# Patient Record
Sex: FEMALE | Race: OTHER | Employment: OTHER | URBAN - METROPOLITAN AREA
[De-identification: names, ages, dates, MRNs, and addresses within clinical notes are randomized per-mention and may not be internally consistent; named-entity substitution may affect disease eponyms.]

---

## 2017-01-31 RX ORDER — ALBUTEROL SULFATE 90 UG/1
AEROSOL, METERED RESPIRATORY (INHALATION)
Qty: 8.5 INHALER | Refills: 5 | Status: SHIPPED | OUTPATIENT
Start: 2017-01-31 | End: 2018-06-21 | Stop reason: SDUPTHER

## 2017-03-01 DIAGNOSIS — I10 ESSENTIAL HYPERTENSION WITH GOAL BLOOD PRESSURE LESS THAN 130/85: ICD-10-CM

## 2017-03-02 RX ORDER — AMLODIPINE BESYLATE 5 MG/1
TABLET ORAL
Qty: 30 TAB | Refills: 3 | Status: SHIPPED | OUTPATIENT
Start: 2017-03-02 | End: 2017-03-08 | Stop reason: SDUPTHER

## 2017-03-08 ENCOUNTER — OFFICE VISIT (OUTPATIENT)
Dept: FAMILY MEDICINE CLINIC | Age: 82
End: 2017-03-08

## 2017-03-08 VITALS
SYSTOLIC BLOOD PRESSURE: 137 MMHG | WEIGHT: 93.4 LBS | DIASTOLIC BLOOD PRESSURE: 83 MMHG | BODY MASS INDEX: 19.61 KG/M2 | RESPIRATION RATE: 18 BRPM | TEMPERATURE: 97.3 F | HEART RATE: 70 BPM | OXYGEN SATURATION: 98 % | HEIGHT: 58 IN

## 2017-03-08 DIAGNOSIS — E03.9 ACQUIRED HYPOTHYROIDISM: ICD-10-CM

## 2017-03-08 DIAGNOSIS — I10 ESSENTIAL HYPERTENSION: Primary | ICD-10-CM

## 2017-03-08 DIAGNOSIS — J45.909 UNCOMPLICATED ASTHMA, UNSPECIFIED ASTHMA SEVERITY: ICD-10-CM

## 2017-03-08 DIAGNOSIS — E87.6 HYPOKALEMIA: ICD-10-CM

## 2017-03-08 RX ORDER — VALSARTAN AND HYDROCHLOROTHIAZIDE 160; 12.5 MG/1; MG/1
TABLET, FILM COATED ORAL
Qty: 30 TAB | Refills: 3 | Status: SHIPPED | OUTPATIENT
Start: 2017-03-08 | End: 2017-07-12 | Stop reason: SDUPTHER

## 2017-03-08 RX ORDER — BUDESONIDE AND FORMOTEROL FUMARATE DIHYDRATE 160; 4.5 UG/1; UG/1
2 AEROSOL RESPIRATORY (INHALATION) 2 TIMES DAILY
Qty: 1 INHALER | Refills: 5 | Status: SHIPPED | OUTPATIENT
Start: 2017-03-08 | End: 2017-07-12 | Stop reason: SDUPTHER

## 2017-03-08 RX ORDER — LEVOTHYROXINE SODIUM 75 UG/1
75 TABLET ORAL
Qty: 30 TAB | Refills: 3 | Status: SHIPPED | OUTPATIENT
Start: 2017-03-08 | End: 2017-07-12 | Stop reason: SDUPTHER

## 2017-03-08 RX ORDER — AMLODIPINE BESYLATE 5 MG/1
TABLET ORAL
Qty: 30 TAB | Refills: 3 | Status: SHIPPED | OUTPATIENT
Start: 2017-03-08 | End: 2017-07-12 | Stop reason: SDUPTHER

## 2017-03-08 RX ORDER — POTASSIUM CHLORIDE 20 MEQ/1
20 TABLET, EXTENDED RELEASE ORAL 2 TIMES DAILY
Qty: 60 TAB | Refills: 3 | Status: SHIPPED | OUTPATIENT
Start: 2017-03-08 | End: 2017-11-15 | Stop reason: SDUPTHER

## 2017-03-08 NOTE — PROGRESS NOTES
HISTORY OF PRESENT ILLNESS  Mk Quintanilla Res is a 80 y.o. female. HPI  HTN, stable, bp is well controlled on meds daily    Hypothyroid, stable on synthroid daily    Asthma, stable, no wheezing or sob, no cough    Hypokalemia, stable on Kcl, last K level was 3.6  Review of Systems   Respiratory: Negative for cough, shortness of breath and wheezing. Cardiovascular: Negative for chest pain, palpitations and leg swelling. Gastrointestinal: Negative for abdominal pain, melena, nausea and vomiting. Musculoskeletal: Negative for falls and myalgias. Neurological: Negative for dizziness and headaches. Physical Exam   Constitutional: She appears well-developed and well-nourished. Neck: Neck supple. No thyromegaly present. Cardiovascular: Normal rate and normal heart sounds. An irregularly irregular rhythm present. No murmur heard. Pulmonary/Chest: Effort normal and breath sounds normal. She has no wheezes. She has no rales. Abdominal: Soft. Bowel sounds are normal. There is no tenderness. A hernia (LLQ, only when standing, reducible, not tender) is present. Musculoskeletal: She exhibits no edema. Neurological: She is alert. Vitals reviewed. ASSESSMENT and PLAN  Jeanie Mcgrath was seen today for hypertension. Diagnoses and all orders for this visit:    Essential hypertension, stable  -     amLODIPine (NORVASC) 5 mg tablet; take 1 tablet by mouth once daily  -     valsartan-hydroCHLOROthiazide (DIOVAN-HCT) 160-12.5 mg per tablet; take 1 tablet by mouth once daily  -     METABOLIC PANEL, BASIC; Future    Acquired hypothyroidism, stable  -     levothyroxine (SYNTHROID) 75 mcg tablet; Take 1 Tab by mouth Daily (before breakfast). -     TSH 3RD GENERATION; Future  -     T4, FREE; Future    Uncomplicated asthma, unspecified asthma severity, stable  -     budesonide-formoterol (SYMBICORT) 160-4.5 mcg/actuation HFA inhaler; Take 2 Puffs by inhalation two (2) times a day.     Hypokalemia, stable  - potassium chloride (KLOR-CON M20) 20 mEq tablet; Take 1 Tab by mouth two (2) times a day. -     METABOLIC PANEL, BASIC; Future    Lab Results   Component Value Date/Time    Sodium 146 11/21/2016 03:05 PM    Potassium 3.6 11/21/2016 03:05 PM    Chloride 107 11/21/2016 03:05 PM    CO2 31 11/21/2016 03:05 PM    Anion gap 8 11/21/2016 03:05 PM    Glucose 82 11/21/2016 03:05 PM    BUN 31 11/21/2016 03:05 PM    Creatinine 1.01 11/21/2016 03:05 PM    BUN/Creatinine ratio 31 11/21/2016 03:05 PM    GFR est AA >60 11/21/2016 03:05 PM    GFR est non-AA 52 11/21/2016 03:05 PM    Calcium 9.0 11/21/2016 03:05 PM    Bilirubin, total 1.0 11/21/2016 03:05 PM    AST (SGOT) 28 11/21/2016 03:05 PM    Alk.  phosphatase 69 11/21/2016 03:05 PM    Protein, total 6.9 11/21/2016 03:05 PM    Albumin 3.8 11/21/2016 03:05 PM    Globulin 3.1 11/21/2016 03:05 PM    A-G Ratio 1.2 11/21/2016 03:05 PM    ALT (SGPT) 24 11/21/2016 03:05 PM   rtc 4 mos

## 2017-03-08 NOTE — PROGRESS NOTES
Benjamin Chowdhury is a 80 y.o. female here today for 4 month follow-up for HTN          1. Have you been to the ER, urgent care clinic since your last visit? Hospitalized since your last visit? No    2. Have you seen or consulted any other health care providers outside of the [address] [place] [address] since your last visit? Include any pap smears or colon screening.  Yes Where: Cardiology

## 2017-07-12 ENCOUNTER — HOSPITAL ENCOUNTER (OUTPATIENT)
Dept: LAB | Age: 82
Discharge: HOME | End: 2017-07-12
Payer: MEDICARE

## 2017-07-12 ENCOUNTER — OFFICE VISIT (OUTPATIENT)
Dept: FAMILY MEDICINE CLINIC | Age: 82
End: 2017-07-12

## 2017-07-12 VITALS
HEIGHT: 58 IN | SYSTOLIC BLOOD PRESSURE: 128 MMHG | TEMPERATURE: 97.1 F | WEIGHT: 90 LBS | OXYGEN SATURATION: 97 % | DIASTOLIC BLOOD PRESSURE: 74 MMHG | RESPIRATION RATE: 20 BRPM | BODY MASS INDEX: 18.89 KG/M2 | HEART RATE: 69 BPM

## 2017-07-12 DIAGNOSIS — J45.909 UNCOMPLICATED ASTHMA, UNSPECIFIED ASTHMA SEVERITY: ICD-10-CM

## 2017-07-12 DIAGNOSIS — I10 ESSENTIAL HYPERTENSION: ICD-10-CM

## 2017-07-12 DIAGNOSIS — E55.9 VITAMIN D DEFICIENCY: ICD-10-CM

## 2017-07-12 DIAGNOSIS — E03.9 ACQUIRED HYPOTHYROIDISM: ICD-10-CM

## 2017-07-12 DIAGNOSIS — E03.9 ACQUIRED HYPOTHYROIDISM: Primary | ICD-10-CM

## 2017-07-12 LAB
25(OH)D3 SERPL-MCNC: 45 NG/ML (ref 30–100)
ALBUMIN SERPL BCP-MCNC: 3.3 G/DL (ref 3.4–5)
ALBUMIN/GLOB SERPL: 1.1 {RATIO} (ref 0.8–1.7)
ALP SERPL-CCNC: 90 U/L (ref 45–117)
ALT SERPL-CCNC: 16 U/L (ref 13–56)
ANION GAP BLD CALC-SCNC: 9 MMOL/L (ref 3–18)
AST SERPL W P-5'-P-CCNC: 21 U/L (ref 15–37)
BASOPHILS # BLD AUTO: 0 K/UL (ref 0–0.06)
BASOPHILS # BLD: 0 % (ref 0–2)
BILIRUB SERPL-MCNC: 0.8 MG/DL (ref 0.2–1)
BUN SERPL-MCNC: 33 MG/DL (ref 7–18)
BUN/CREAT SERPL: 27 (ref 12–20)
CALCIUM SERPL-MCNC: 8.7 MG/DL (ref 8.5–10.1)
CHLORIDE SERPL-SCNC: 104 MMOL/L (ref 100–108)
CO2 SERPL-SCNC: 28 MMOL/L (ref 21–32)
CREAT SERPL-MCNC: 1.24 MG/DL (ref 0.6–1.3)
DIFFERENTIAL METHOD BLD: ABNORMAL
EOSINOPHIL # BLD: 0.1 K/UL (ref 0–0.4)
EOSINOPHIL NFR BLD: 3 % (ref 0–5)
ERYTHROCYTE [DISTWIDTH] IN BLOOD BY AUTOMATED COUNT: 16.2 % (ref 11.6–14.5)
GLOBULIN SER CALC-MCNC: 3.1 G/DL (ref 2–4)
GLUCOSE SERPL-MCNC: 93 MG/DL (ref 74–99)
HCT VFR BLD AUTO: 31.2 % (ref 35–45)
HGB BLD-MCNC: 10 G/DL (ref 12–16)
LYMPHOCYTES # BLD AUTO: 22 % (ref 21–52)
LYMPHOCYTES # BLD: 1 K/UL (ref 0.9–3.6)
MCH RBC QN AUTO: 20.6 PG (ref 24–34)
MCHC RBC AUTO-ENTMCNC: 32.1 G/DL (ref 31–37)
MCV RBC AUTO: 64.2 FL (ref 74–97)
MONOCYTES # BLD: 0.2 K/UL (ref 0.05–1.2)
MONOCYTES NFR BLD AUTO: 4 % (ref 3–10)
NEUTS SEG # BLD: 3.2 K/UL (ref 1.8–8)
NEUTS SEG NFR BLD AUTO: 71 % (ref 40–73)
PLATELET # BLD AUTO: 152 K/UL (ref 135–420)
POTASSIUM SERPL-SCNC: 3.8 MMOL/L (ref 3.5–5.5)
PROT SERPL-MCNC: 6.4 G/DL (ref 6.4–8.2)
RBC # BLD AUTO: 4.86 M/UL (ref 4.2–5.3)
SODIUM SERPL-SCNC: 141 MMOL/L (ref 136–145)
TSH SERPL DL<=0.05 MIU/L-ACNC: 1.29 UIU/ML (ref 0.36–3.74)
WBC # BLD AUTO: 4.5 K/UL (ref 4.6–13.2)

## 2017-07-12 PROCEDURE — 36415 COLL VENOUS BLD VENIPUNCTURE: CPT | Performed by: INTERNAL MEDICINE

## 2017-07-12 PROCEDURE — 85025 COMPLETE CBC W/AUTO DIFF WBC: CPT | Performed by: INTERNAL MEDICINE

## 2017-07-12 PROCEDURE — 80053 COMPREHEN METABOLIC PANEL: CPT | Performed by: INTERNAL MEDICINE

## 2017-07-12 PROCEDURE — 84443 ASSAY THYROID STIM HORMONE: CPT | Performed by: INTERNAL MEDICINE

## 2017-07-12 PROCEDURE — 82306 VITAMIN D 25 HYDROXY: CPT | Performed by: INTERNAL MEDICINE

## 2017-07-12 PROCEDURE — 84439 ASSAY OF FREE THYROXINE: CPT | Performed by: INTERNAL MEDICINE

## 2017-07-12 RX ORDER — LEVOTHYROXINE SODIUM 75 UG/1
75 TABLET ORAL
Qty: 30 TAB | Refills: 3 | Status: SHIPPED | OUTPATIENT
Start: 2017-07-12 | End: 2017-11-15 | Stop reason: SDUPTHER

## 2017-07-12 RX ORDER — VALSARTAN AND HYDROCHLOROTHIAZIDE 160; 12.5 MG/1; MG/1
TABLET, FILM COATED ORAL
Qty: 30 TAB | Refills: 3 | Status: SHIPPED | OUTPATIENT
Start: 2017-07-12 | End: 2017-11-15 | Stop reason: SDUPTHER

## 2017-07-12 RX ORDER — AMLODIPINE BESYLATE 5 MG/1
TABLET ORAL
Qty: 30 TAB | Refills: 3 | Status: SHIPPED | OUTPATIENT
Start: 2017-07-12 | End: 2017-11-15 | Stop reason: SDUPTHER

## 2017-07-12 RX ORDER — BUDESONIDE AND FORMOTEROL FUMARATE DIHYDRATE 160; 4.5 UG/1; UG/1
2 AEROSOL RESPIRATORY (INHALATION) 2 TIMES DAILY
Qty: 1 INHALER | Refills: 5 | Status: SHIPPED | OUTPATIENT
Start: 2017-07-12 | End: 2017-07-17 | Stop reason: CLARIF

## 2017-07-12 NOTE — PROGRESS NOTES
Rochelle Batista is a 80 y.o. female  1. Have you been to the ER, urgent care clinic since your last visit? Hospitalized since your last visit? No    2. Have you seen or consulted any other health care providers outside of the [address] [place] [address] since your last visit? Include any pap smears or colon screening.  No

## 2017-07-12 NOTE — PROGRESS NOTES
HISTORY OF PRESENT ILLNESS  Mk Brower is a 80 y.o. female. HPI  Hypothyroid, stable on synthroid daily, last TSH was normal  HTN, stable, bp is well controlled on meds daily  Asthma, stable on symbicort, no wheezing or sob  Vit D def, stable on 2000 iu daily  Review of Systems   Respiratory: Negative for cough, shortness of breath and wheezing. Cardiovascular: Negative for chest pain, palpitations and leg swelling. Gastrointestinal: Negative for abdominal pain, melena and nausea. Musculoskeletal: Negative for falls and myalgias. Neurological: Negative for dizziness and headaches. Physical Exam   Constitutional: She appears well-developed and well-nourished. Neck: Neck supple. No thyromegaly present. Cardiovascular: Normal rate, regular rhythm and normal heart sounds. No murmur heard. Pulmonary/Chest: Effort normal and breath sounds normal. She has no wheezes. She has no rales. Abdominal: Soft. Bowel sounds are normal. There is no tenderness. Musculoskeletal: She exhibits no edema. Neurological: She is alert. Vitals reviewed. ASSESSMENT and PLAN  Patrizia Espino was seen today for follow up chronic condition. Diagnoses and all orders for this visit:    Acquired hypothyroidism, stable  -     TSH 3RD GENERATION; Future  -     T4, FREE; Future  -     levothyroxine (SYNTHROID) 75 mcg tablet; Take 1 Tab by mouth Daily (before breakfast). Uncomplicated asthma, unspecified asthma severity, stable  -     budesonide-formoterol (SYMBICORT) 160-4.5 mcg/actuation HFA inhaler; Take 2 Puffs by inhalation two (2) times a day. Essential hypertension, stable  -     CBC WITH AUTOMATED DIFF; Future  -     METABOLIC PANEL, COMPREHENSIVE; Future  -     TSH 3RD GENERATION; Future  -     T4, FREE;  Future  -     amLODIPine (NORVASC) 5 mg tablet; take 1 tablet by mouth once daily  -     valsartan-hydroCHLOROthiazide (DIOVAN-HCT) 160-12.5 mg per tablet; take 1 tablet by mouth once daily    Vitamin D deficiency, stable  -     VITAMIN D, 25 HYDROXY; Future  Advised to increase her po intake/weight    Lab Results   Component Value Date/Time    Vitamin D 25-Hydroxy 48.8 07/19/2016 11:21 AM       Lab Results   Component Value Date/Time    Sodium 146 11/21/2016 03:05 PM    Potassium 3.6 11/21/2016 03:05 PM    Chloride 107 11/21/2016 03:05 PM    CO2 31 11/21/2016 03:05 PM    Anion gap 8 11/21/2016 03:05 PM    Glucose 82 11/21/2016 03:05 PM    BUN 31 11/21/2016 03:05 PM    Creatinine 1.01 11/21/2016 03:05 PM    BUN/Creatinine ratio 31 11/21/2016 03:05 PM    GFR est AA >60 11/21/2016 03:05 PM    GFR est non-AA 52 11/21/2016 03:05 PM    Calcium 9.0 11/21/2016 03:05 PM    Bilirubin, total 1.0 11/21/2016 03:05 PM    AST (SGOT) 28 11/21/2016 03:05 PM    Alk.  phosphatase 69 11/21/2016 03:05 PM    Protein, total 6.9 11/21/2016 03:05 PM    Albumin 3.8 11/21/2016 03:05 PM    Globulin 3.1 11/21/2016 03:05 PM    A-G Ratio 1.2 11/21/2016 03:05 PM    ALT (SGPT) 24 11/21/2016 03:05 PM     Lab Results   Component Value Date/Time    TSH 5.87 11/21/2016 03:05 PM   rtc 4 mos

## 2017-07-13 LAB — T4 FREE SERPL-MCNC: 1.3 NG/DL (ref 0.7–1.5)

## 2017-07-17 NOTE — PROGRESS NOTES
Pt's daughter notified. Pt would like a RX to get a walker with chair. Please generate Rx for walker.

## 2017-07-18 DIAGNOSIS — R26.9 ABNORMALITY OF GAIT AND MOBILITY: Primary | ICD-10-CM

## 2017-08-01 ENCOUNTER — TELEPHONE (OUTPATIENT)
Dept: FAMILY MEDICINE CLINIC | Age: 82
End: 2017-08-01

## 2017-08-01 DIAGNOSIS — R26.9 ABNORMALITY OF GAIT AND MOBILITY: Primary | ICD-10-CM

## 2017-08-01 NOTE — TELEPHONE ENCOUNTER
Pt needs demographics and written RX for rolling walker with a seat.  Thank you  [facility] (accepts Medicare)  Attn: Eligibility Dept  Main: [phone]  Fax [phone]

## 2017-08-01 NOTE — TELEPHONE ENCOUNTER
Pt's daughter, Elizabeth Guerrero called in regards to the pt, they are needing a rolling walker with a seat and they found a place that will supply that that accepts medicare. She wants us to to call them and see all that they want from us.      Nidhi Block   [phone]

## 2017-08-01 NOTE — TELEPHONE ENCOUNTER
Called and spoke to pt's daughter and made aware that [facility] doesn't accept Medicare and I will be contacting [facility]

## 2017-08-01 NOTE — TELEPHONE ENCOUNTER
[facility]) said that they dont have the rolling walker right now and they dont accept services for Medicare at the moment.  They recommend :  [facility]  [phone]

## 2017-10-02 ENCOUNTER — TELEPHONE (OUTPATIENT)
Dept: FAMILY MEDICINE CLINIC | Age: 82
End: 2017-10-02

## 2017-10-02 NOTE — TELEPHONE ENCOUNTER
[employer] rep called to inform us that they are going to be having a care plan meeting with the patient and her family at the home of the patient next Thursday Oct.12 th, 2017 at 2:30pm

## 2017-11-15 ENCOUNTER — OFFICE VISIT (OUTPATIENT)
Dept: FAMILY MEDICINE CLINIC | Age: 82
End: 2017-11-15

## 2017-11-15 VITALS
SYSTOLIC BLOOD PRESSURE: 155 MMHG | DIASTOLIC BLOOD PRESSURE: 60 MMHG | WEIGHT: 88 LBS | HEART RATE: 72 BPM | TEMPERATURE: 97 F | HEIGHT: 58 IN | OXYGEN SATURATION: 99 % | RESPIRATION RATE: 20 BRPM | BODY MASS INDEX: 18.47 KG/M2

## 2017-11-15 DIAGNOSIS — I48.20 CHRONIC ATRIAL FIBRILLATION (HCC): ICD-10-CM

## 2017-11-15 DIAGNOSIS — E03.9 ACQUIRED HYPOTHYROIDISM: ICD-10-CM

## 2017-11-15 DIAGNOSIS — I10 ESSENTIAL HYPERTENSION: Primary | ICD-10-CM

## 2017-11-15 DIAGNOSIS — E87.6 HYPOKALEMIA: ICD-10-CM

## 2017-11-15 LAB
BILIRUB UR QL STRIP: NEGATIVE
GLUCOSE UR-MCNC: NEGATIVE MG/DL
KETONES P FAST UR STRIP-MCNC: NEGATIVE MG/DL
PH UR STRIP: 5.5 [PH] (ref 4.6–8)
PROT UR QL STRIP: NEGATIVE
SP GR UR STRIP: 1.01 (ref 1–1.03)
UA UROBILINOGEN AMB POC: NORMAL (ref 0.2–1)
URINALYSIS CLARITY POC: CLEAR
URINALYSIS COLOR POC: YELLOW
URINE BLOOD POC: NORMAL
URINE LEUKOCYTES POC: NEGATIVE
URINE NITRITES POC: NEGATIVE

## 2017-11-15 RX ORDER — VALSARTAN AND HYDROCHLOROTHIAZIDE 160; 12.5 MG/1; MG/1
TABLET, FILM COATED ORAL
Qty: 30 TAB | Refills: 3 | Status: SHIPPED | OUTPATIENT
Start: 2017-11-15 | End: 2018-02-27

## 2017-11-15 RX ORDER — POTASSIUM CHLORIDE 20 MEQ/1
20 TABLET, EXTENDED RELEASE ORAL 2 TIMES DAILY
Qty: 60 TAB | Refills: 3 | Status: SHIPPED | OUTPATIENT
Start: 2017-11-15 | End: 2018-02-27 | Stop reason: SDUPTHER

## 2017-11-15 RX ORDER — LEVOTHYROXINE SODIUM 75 UG/1
75 TABLET ORAL
Qty: 30 TAB | Refills: 3 | Status: SHIPPED | OUTPATIENT
Start: 2017-11-15 | End: 2018-02-27 | Stop reason: SDUPTHER

## 2017-11-15 RX ORDER — AMLODIPINE BESYLATE 10 MG/1
TABLET ORAL
Qty: 30 TAB | Refills: 3 | Status: SHIPPED | OUTPATIENT
Start: 2017-11-15 | End: 2018-02-27 | Stop reason: SDUPTHER

## 2017-11-15 NOTE — PROGRESS NOTES
HISTORY OF PRESENT ILLNESS  Mk Fitzgerald is a 80 y.o. female. HPI  HTN, not well controlled, her bp has been elevated, more than 482 systolic, she is taking her meds daily  Hypothyroid, stable on synthroid, last TSH was normal  Hypokalemia, stable, on K dur BID    Hematuria, chronic, she was evaluated by urology in 2015, had US that showed renal cysts, had negative Cystoscopy then    A fib, stable, followed by Cardiology and coumadin clinic  Review of Systems   Constitutional: Negative for fever. Respiratory: Negative for cough, shortness of breath and wheezing. Cardiovascular: Negative for chest pain, palpitations and leg swelling. Gastrointestinal: Negative for abdominal pain, melena and nausea. Genitourinary: Negative for dysuria. Musculoskeletal: Negative for falls and myalgias. Neurological: Negative for dizziness and headaches. Physical Exam   Constitutional: She appears well-developed and well-nourished. No distress. Neck: No thyromegaly present. Cardiovascular: Normal rate and normal heart sounds. An irregularly irregular rhythm present. No murmur heard. Pulmonary/Chest: Effort normal and breath sounds normal. She has no wheezes. She has no rales. Abdominal: Soft. Bowel sounds are normal. There is no tenderness. Musculoskeletal: She exhibits no edema. Neurological: She is alert. Vitals reviewed. ASSESSMENT and PLAN  Diagnoses and all orders for this visit:    1. Essential hypertension , not well controlled, increase norvasc dose, will return in a month for bp check  -     AMB POC URINALYSIS DIP STICK AUTO W/O MICRO  -     amLODIPine (NORVASC) 10 mg tablet; take 1 tablet by mouth once daily  -     valsartan-hydroCHLOROthiazide (DIOVAN-HCT) 160-12.5 mg per tablet; take 1 tablet by mouth once daily    2. Acquired hypothyroidism, stable  -     levothyroxine (SYNTHROID) 75 mcg tablet; Take 1 Tab by mouth Daily (before breakfast).     3. Hypokalemia, stable  - potassium chloride (KLOR-CON M20) 20 mEq tablet; Take 1 Tab by mouth two (2) times a day. 4. Chronic atrial fibrillation ([address] stable, followed and managed by Cardiology    Advised to follow up with urology for her chronic hematuria    rtc 3 mos    Lab Results   Component Value Date/Time    Sodium 141 07/12/2017 10:52 AM    Potassium 3.8 07/12/2017 10:52 AM    Chloride 104 07/12/2017 10:52 AM    CO2 28 07/12/2017 10:52 AM    Anion gap 9 07/12/2017 10:52 AM    Glucose 93 07/12/2017 10:52 AM    BUN 33 07/12/2017 10:52 AM    Creatinine 1.24 07/12/2017 10:52 AM    BUN/Creatinine ratio 27 07/12/2017 10:52 AM    GFR est AA 50 07/12/2017 10:52 AM    GFR est non-AA 41 07/12/2017 10:52 AM    Calcium 8.7 07/12/2017 10:52 AM    Bilirubin, total 0.8 07/12/2017 10:52 AM    AST (SGOT) 21 07/12/2017 10:52 AM    Alk.  phosphatase 90 07/12/2017 10:52 AM    Protein, total 6.4 07/12/2017 10:52 AM    Albumin 3.3 07/12/2017 10:52 AM    Globulin 3.1 07/12/2017 10:52 AM    A-G Ratio 1.1 07/12/2017 10:52 AM    ALT (SGPT) 16 07/12/2017 10:52 AM     Lab Results   Component Value Date/Time    TSH 1.29 07/12/2017 10:52 AM     Results for orders placed or performed in visit on 11/15/17   AMB POC URINALYSIS DIP STICK AUTO W/O MICRO   Result Value Ref Range    Color (UA POC) Yellow     Clarity (UA POC) Clear     Glucose (UA POC) Negative Negative    Bilirubin (UA POC) Negative Negative    Ketones (UA POC) Negative Negative    Specific gravity (UA POC) 1.015 1.001 - 1.035    Blood (UA POC) 2+ Negative    pH (UA POC) 5.5 4.6 - 8.0    Protein (UA POC) Negative Negative    Urobilinogen (UA POC) 1 mg/dL 0.2 - 1    Nitrites (UA POC) Negative Negative    Leukocyte esterase (UA POC) Negative Negative

## 2017-11-15 NOTE — PROGRESS NOTES
Sudhakar Persaud is a 80 y.o. female (: 1930) presenting to address:    Chief Complaint   Patient presents with    Hypertension    Blood in Urine    Skin Problem     Left axilla, abdomin       Vitals:    11/15/17 1538   BP: 155/60   Pulse: 72   Resp: 20   Temp: 97 °F (36.1 °C)   TempSrc: Oral   SpO2: 99%   Weight: 88 lb (39.9 kg)   Height: 4' 10\" (1.473 m)   PainSc:   5   PainLoc: Generalized       Learning Assessment:     Learning Assessment 2015   PRIMARY LEARNER Patient   HIGHEST LEVEL OF EDUCATION - PRIMARY LEARNER  DID NOT GRADUATE HIGH SCHOOL   BARRIERS PRIMARY LEARNER READING   CO-LEARNER CAREGIVER Yes   CO-LEARNER NAME Daughter- [place] HIGHEST LEVEL OF EDUCATION 4 YEARS OF [address] (MANDARIN)   PRIMARY LANGUAGE CO-LEARNER CHINESE (MANDARIN)    NEED No   LEARNER PREFERENCE PRIMARY PICTURES   LEARNER PREFERENCE CO-LEARNER READING   LEARNING SPECIAL TOPICS none   ANSWERED BY patient   RELATIONSHIP SELF   ASSESSMENT COMMENT n/a     Depression Screening:     PHQ over the last two weeks 2017   Little interest or pleasure in doing things Not at all   Feeling down, depressed or hopeless Not at all   Total Score PHQ 2 0     Fall Risk Assessment:     Fall Risk Assessment, last 12 mths 2017   Able to walk? Yes   Fall in past 12 months? No     Abuse Screening:     Abuse Screening Questionnaire 11/15/2017   Do you ever feel afraid of your partner? N   Are you in a relationship with someone who physically or mentally threatens you? N   Is it safe for you to go home? Y     Coordination of Care Questionaire:   1. Have you been to the ER, urgent care clinic since your last visit? Hospitalized since your last visit? NO    2. Have you seen or consulted any other health care providers outside of the [address] [place] [address] since your last visit? Include any pap smears or colon screening. NO    Advanced Directive:   1.  Do you have an Advanced Directive? YES    2. Would you like information on Advanced Directives?  NO

## 2017-11-15 NOTE — MR AVS SNAPSHOT
Visit Information Date & Time Provider Department Dept. Phone Encounter #  
 11/15/2017  3:30 PM Willem Byrnes [facility] [phone] [id number] Follow-up Instructions Return in about 3 months (around 2/15/2018). Upcoming Health Maintenance Date Due DTaP/Tdap/Td series (1 - Tdap) 6/22/1951 Pneumococcal 65+ Low/Medium Risk (2 of 2 - PCV13) 5/30/2015 MEDICARE YEARLY EXAM 8/10/2016 GLAUCOMA SCREENING Q2Y 4/7/2019 Allergies as of 11/15/2017  Review Complete On: 11/15/2017 By: Zahraa Dominique No Known Allergies Current Immunizations  Reviewed on 11/11/2017 Name Date Influenza High Dose Vaccine PF 10/3/2017, 10/31/2016, 10/7/2015 Influenza Vaccine 9/22/2014 Pneumococcal Polysaccharide (PPSV-23) 5/30/2014 Not reviewed this visit You Were Diagnosed With   
  
 Codes Comments Essential hypertension    -  Primary ICD-10-CM: I10 
ICD-9-CM: 401.9 Acquired hypothyroidism     ICD-10-CM: E03.9 ICD-9-CM: 244.9 Hypokalemia     ICD-10-CM: E87.6 ICD-9-CM: 276.8 Chronic atrial fibrillation (HCC)     ICD-10-CM: J48.2 ICD-9-CM: 427.31 Vitals BP Pulse Temp Resp Height(growth percentile) Weight(growth percentile) 155/60 (BP 1 Location: Left arm, BP Patient Position: Sitting) 72 97 °F (36.1 °C) (Oral) 20 4' 10\" (1.473 m) 88 lb (39.9 kg) SpO2 BMI OB Status Smoking Status 99% 18.39 kg/m2 Postmenopausal Never Smoker Vitals History BMI and BSA Data Body Mass Index Body Surface Area  
 18.39 kg/m 2 1.28 m 2 Preferred Pharmacy Pharmacy Name Phone [id number] [address] [facility] [phone] Your Updated Medication List  
  
   
This list is accurate as of: 11/15/17  4:30 PM.  Always use your most recent med list. amLODIPine 10 mg tablet Commonly known as:  NORVASC  
take 1 tablet by mouth once daily Cholecalciferol (Vitamin D3) 2,000 unit Cap capsule Commonly known as:  VITAMIN D3 Take  by mouth daily. COUMADIN 1 mg tablet Generic drug:  warfarin Take 2 mg by mouth daily. ferrous sulfate 325 mg (65 mg iron) tablet Commonly known as:  Vinh Shore Take 1 Tab by mouth two (2) times a day. fluticasone-salmeterol 115-21 mcg/actuation inhaler Commonly known as:  ADVAIR HFA Take 2 Puffs by inhalation two (2) times a day. levothyroxine 75 mcg tablet Commonly known as:  SYNTHROID Take 1 Tab by mouth Daily (before breakfast). montelukast 10 mg tablet Commonly known as:  SINGULAIR Take 1 Tab by mouth daily. potassium chloride 20 mEq tablet Commonly known as:  KLOR-CON M20 Take 1 Tab by mouth two (2) times a day. PROAIR HFA 90 mcg/actuation inhaler Generic drug:  albuterol  
inhale 2 puffs every 4 hours if needed for wheezing * valsartan-hydroCHLOROthiazide 160-12.5 mg per tablet Commonly known as:  DIOVAN HCT Take 1 Tab by mouth daily. * valsartan-hydroCHLOROthiazide 160-12.5 mg per tablet Commonly known as:  DIOVAN-HCT  
take 1 tablet by mouth once daily * Notice: This list has 2 medication(s) that are the same as other medications prescribed for you. Read the directions carefully, and ask your doctor or other care provider to review them with you. Prescriptions Sent to Pharmacy Refills  
 amLODIPine (NORVASC) 10 mg tablet 3 Sig: take 1 tablet by mouth once daily Class: Normal  
 Pharmacy: [address] [facility] Ph #: [phone]  
 valsartan-hydroCHLOROthiazide (DIOVAN-HCT) 160-12.5 mg per tablet 3 Sig: take 1 tablet by mouth once daily  Class: Normal  
 Pharmacy: [address], [address] [facility] Ph #: [phone]  
 levothyroxine (SYNTHROID) 75 mcg tablet 3  
 Sig: Take 1 Tab by mouth Daily (before breakfast). Class: Normal  
 Pharmacy: [facility] [facility] [address] [facility], [facility] #: [phone] Route: Oral  
 potassium chloride (KLOR-CON M20) 20 mEq tablet 3 Sig: Take 1 Tab by mouth two (2) times a day. Class: Normal  
 Pharmacy: [place] [id number] [facility], [facility] [address] [facility] Ph #: [phone] Route: Oral  
  
We Performed the Following AMB POC URINALYSIS DIP STICK AUTO W/O MICRO [64609 CPT(R)] Follow-up Instructions Return in about 3 months (around 2/15/2018). Introducing [facility] SERVICES! Dear Yadira Roldan: Thank you for requesting a [employer] account. Our records indicate that you already have an active [employer] account. You can access your account anytime at [email or web address] Did you know that you can access your hospital and ER discharge instructions at any time in [employer]? You can also review all of your test results from your hospital stay or ER visit. Additional Information If you have questions, please visit the Frequently Asked Questions section of the [employer] website at [email or web address] Remember, [employer] is NOT to be used for urgent needs. For medical emergencies, dial 911. Now available from your iPhone and Android! Please provide this summary of care documentation to your next provider. Your primary care clinician is listed as Arelis Lopez. If you have any questions after today's visit, please call [phone].

## 2018-02-06 ENCOUNTER — OFFICE VISIT (OUTPATIENT)
Dept: FAMILY MEDICINE CLINIC | Age: 83
End: 2018-02-06

## 2018-02-06 VITALS
SYSTOLIC BLOOD PRESSURE: 131 MMHG | OXYGEN SATURATION: 98 % | DIASTOLIC BLOOD PRESSURE: 64 MMHG | WEIGHT: 89 LBS | HEART RATE: 75 BPM | BODY MASS INDEX: 18.68 KG/M2 | HEIGHT: 58 IN | TEMPERATURE: 97.2 F | RESPIRATION RATE: 20 BRPM

## 2018-02-06 DIAGNOSIS — B02.9 HERPES ZOSTER WITHOUT COMPLICATION: Primary | ICD-10-CM

## 2018-02-06 RX ORDER — GABAPENTIN 100 MG/1
100-200 CAPSULE ORAL
Qty: 50 CAP | Refills: 0 | Status: SHIPPED | OUTPATIENT
Start: 2018-02-06 | End: 2018-06-21

## 2018-02-06 RX ORDER — VALACYCLOVIR HYDROCHLORIDE 1 G/1
1000 TABLET, FILM COATED ORAL 3 TIMES DAILY
Qty: 21 TAB | Refills: 0 | Status: SHIPPED | OUTPATIENT
Start: 2018-02-06 | End: 2018-02-27

## 2018-02-06 NOTE — MR AVS SNAPSHOT
Angel Smoker 
 
 
 [address] [facility] [address] 
[phone] Patient: Avery Tilley MRN: [id number] OEW:7/38/8503 Visit Information Date & Time Provider Department Dept. Phone Encounter #  
 2/6/2018  4:15 PM Juliana Buitrago, 3 [facility] [phone] [id number] Follow-up Instructions Return in about 4 weeks (around 3/6/2018). Your Appointments 2/12/2018  3:30 PM  
Follow Up with Juliana Buitrago MD  
3 [address]) Appt Note: 3 month f/u  
 [address] [facility] [id number] [phone]  
  
   
 [address] [address] [address] Upcoming Health Maintenance Date Due DTaP/Tdap/Td series (1 - Tdap) 6/22/1951 Pneumococcal 65+ Low/Medium Risk (2 of 2 - PCV13) 5/30/2015 MEDICARE YEARLY EXAM 8/10/2016 GLAUCOMA SCREENING Q2Y 4/7/2019 Allergies as of 2/6/2018  Review Complete On: 2/6/2018 By: Purvi Brian No Known Allergies Current Immunizations  Reviewed on 11/11/2017 Name Date Influenza High Dose Vaccine PF 10/3/2017, 10/31/2016, 10/7/2015 Influenza Vaccine 9/22/2014 Pneumococcal Polysaccharide (PPSV-23) 5/30/2014 Not reviewed this visit You Were Diagnosed With   
  
 Codes Comments Herpes zoster without complication    -  Primary ICD-10-CM: B02.9 ICD-9-CM: 617. 9 Vitals BP Pulse Temp Resp Height(growth percentile) Weight(growth percentile) 131/64 (BP 1 Location: Right arm, BP Patient Position: Sitting) 75 97.2 °F (36.2 °C) (Oral) 20 4' 10\" (1.473 m) 89 lb (40.4 kg) SpO2 BMI OB Status Smoking Status 98% 18.6 kg/m2 Postmenopausal Never Smoker Vitals History BMI and BSA Data Body Mass Index Body Surface Area  
 18.6 kg/m 2 1.29 m 2 Preferred Pharmacy Pharmacy Name Phone [facility], [address] [facility] [phone] Your Updated Medication List  
  
   
This list is accurate as of: 2/6/18  4:51 PM.  Always use your most recent med list. amLODIPine 10 mg tablet Commonly known as:  NORVASC  
take 1 tablet by mouth once daily Cholecalciferol (Vitamin D3) 2,000 unit Cap capsule Commonly known as:  VITAMIN D3 Take  by mouth daily. COUMADIN 1 mg tablet Generic drug:  warfarin Take 2 mg by mouth daily. ferrous sulfate 325 mg (65 mg iron) tablet Commonly known as:  Rosario Cordoba Take 1 Tab by mouth two (2) times a day. fluticasone-salmeterol 115-21 mcg/actuation inhaler Commonly known as:  ADVAIR HFA Take 2 Puffs by inhalation two (2) times a day.  
  
 gabapentin 100 mg capsule Commonly known as:  NEURONTIN Take 1-2 Caps by mouth three (3) times daily as needed. Indications: NEUROPATHIC PAIN  
  
 levothyroxine 75 mcg tablet Commonly known as:  SYNTHROID Take 1 Tab by mouth Daily (before breakfast). montelukast 10 mg tablet Commonly known as:  SINGULAIR Take 1 Tab by mouth daily. potassium chloride 20 mEq tablet Commonly known as:  KLOR-CON M20 Take 1 Tab by mouth two (2) times a day. PROAIR HFA 90 mcg/actuation inhaler Generic drug:  albuterol  
inhale 2 puffs every 4 hours if needed for wheezing  
  
 valACYclovir 1 gram tablet Commonly known as:  VALTREX Take 1 Tab by mouth three (3) times daily. Indications: herpes zoster * valsartan-hydroCHLOROthiazide 160-12.5 mg per tablet Commonly known as:  DIOVAN HCT Take 1 Tab by mouth daily. * valsartan-hydroCHLOROthiazide 160-12.5 mg per tablet Commonly known as:  DIOVAN-HCT  
take 1 tablet by mouth once daily * Notice: This list has 2 medication(s) that are the same as other medications prescribed for you.  Read the directions carefully, and ask your doctor or other care provider to review them with you. Prescriptions Sent to Pharmacy Refills  
 valACYclovir (VALTREX) 1 gram tablet 0 Sig: Take 1 Tab by mouth three (3) times daily. Indications: herpes zoster Class: Normal  
 Pharmacy: [address], [address] [facility] #: [phone] Route: Oral  
 gabapentin (NEURONTIN) 100 mg capsule 0 Sig: Take 1-2 Caps by mouth three (3) times daily as needed. Indications: NEUROPATHIC PAIN Class: Normal  
 Pharmacy: [address], [address] [facility] #: [phone] Route: Oral  
  
Follow-up Instructions Return in about 4 weeks (around 3/6/2018). Patient Instructions Shingles: Care Instructions Your Care Instructions Shingles (herpes zoster) causes pain and a blistered rash. The rash can appear anywhere on the body but will be on only one side of the body, the left or right. It will be in a band, a strip, or a small area. The pain can be very severe. Shingles can also cause tingling or itching in the area of the rash. The blisters scab over after a few days and heal in 2 to 4 weeks. Medicines can help you feel better and may help prevent more serious problems caused by shingles. Shingles is caused by the same virus that causes chickenpox. When you have chickenpox, the virus gets into your nerve roots and stays there (becomes dormant) long after you get over the chickenpox. If the virus becomes active again, it can cause shingles. Follow-up care is a key part of your treatment and safety. Be sure to make and go to all appointments, and call your doctor if you are having problems. It's also a good idea to know your test results and keep a list of the medicines you take. How can you care for yourself at home? · Be safe with medicines. Take your medicines exactly as prescribed.  Call your doctor if you think you are having a problem with your medicine. Antiviral medicine helps you get better faster. · Try not to scratch or pick at the blisters. They will crust over and fall off on their own if you leave them alone. · Put cool, wet cloths on the area to relieve pain and itching. You can also use calamine lotion. Try not to use so much lotion that it cakes and is hard to get off. · Put cornstarch or baking soda on the sores to help dry them out so they heal faster. · Do not use thick ointment, such as petroleum jelly, on the sores. This will keep them from drying and healing. · To help remove loose crusts, soak them in tap water. This can help decrease oozing, and dry and soothe the skin. · Take an over-the-counter pain medicine, such as acetaminophen (Tylenol), ibuprofen (Advil, Motrin), or naproxen (Aleve). Read and follow all instructions on the label. · Avoid close contact with people until the blisters have healed. It is very important for you to avoid contact with anyone who has never had chickenpox or the chickenpox vaccine. Pregnant women, young babies, and anyone else who has a hard time fighting infection (such as someone with HIV, diabetes, or cancer) is especially at risk. When should you call for help? Call your doctor now or seek immediate medical care if: 
? · You have a new or higher fever. ? · You have a severe headache and a stiff neck. ? · You lose the ability to think clearly. ? · The rash spreads to your forehead, nose, eyes, or eyelids. ? · You have eye pain, or your vision gets worse. ? · You have new pain in your face, or you cannot move the muscles in your face. ? · Blisters spread to new parts of your body. ? Watch closely for changes in your health, and be sure to contact your doctor if: 
? · The rash has not healed after 2 to 4 weeks. ? · You still have pain after the rash has healed. Where can you learn more? Go to http://shonna-richard.info/. Vinayariel Hand in the search box to learn more about \"Shingles: Care Instructions. \" Current as of: March 3, 2017 Content Version: 11.4 © 8753-9795 [employer]. Care instructions adapted under license by [employer] (which disclaims liability or warranty for this information). If you have questions about a medical condition or this instruction, always ask your healthcare professional. [address] any warranty or liability for your use of this information. Introducing [facility] & HEALTH SERVICES! Dear Adán Stiles: Thank you for requesting a [employer] account. Our records indicate that you already have an active [employer] account. You can access your account anytime at [email or web address] Did you know that you can access your hospital and ER discharge instructions at any time in [employer]? You can also review all of your test results from your hospital stay or ER visit. Additional Information If you have questions, please visit the Frequently Asked Questions section of the [employer] website at [email or web address] Remember, [employer] is NOT to be used for urgent needs. For medical emergencies, dial 911. Now available from your iPhone and Android! Please provide this summary of care documentation to your next provider. Your primary care clinician is listed as Maria Luisa Lopez. If you have any questions after today's visit, please call [phone].

## 2018-02-06 NOTE — PROGRESS NOTES
HISTORY OF PRESENT ILLNESS  Mk Nassar is a 80 y.o. female. HPI  Started with painful rash on the left side of her back/abdomin 2-3 days ago, not improving  Review of Systems   Constitutional: Negative for chills and fever. Respiratory: Negative for cough. Cardiovascular: Negative for chest pain. Gastrointestinal: Negative for abdominal pain and nausea. Physical Exam   Cardiovascular: Normal rate, regular rhythm and normal heart sounds. Pulmonary/Chest: Effort normal and breath sounds normal.   Skin: Rash (multiple erythematous patches with small central blisters on the left side of thoracic spine and left side of mid abdomin) noted. Vitals reviewed. ASSESSMENT and PLAN  Diagnoses and all orders for this visit:    1. Herpes zoster without complication  -     valACYclovir (VALTREX) 1 gram tablet; Take 1 Tab by mouth three (3) times daily. Indications: herpes zoster  -     gabapentin (NEURONTIN) 100 mg capsule; Take 1-2 Caps by mouth three (3) times daily as needed.  Indications: NEUROPATHIC PAIN    rtc 3 wks

## 2018-02-06 NOTE — PATIENT INSTRUCTIONS
Shingles: Care Instructions  Your Care Instructions    Shingles (herpes zoster) causes pain and a blistered rash. The rash can appear anywhere on the body but will be on only one side of the body, the left or right. It will be in a band, a strip, or a small area. The pain can be very severe. Shingles can also cause tingling or itching in the area of the rash. The blisters scab over after a few days and heal in 2 to 4 weeks. Medicines can help you feel better and may help prevent more serious problems caused by shingles. Shingles is caused by the same virus that causes chickenpox. When you have chickenpox, the virus gets into your nerve roots and stays there (becomes dormant) long after you get over the chickenpox. If the virus becomes active again, it can cause shingles. Follow-up care is a key part of your treatment and safety. Be sure to make and go to all appointments, and call your doctor if you are having problems. It's also a good idea to know your test results and keep a list of the medicines you take. How can you care for yourself at home? · Be safe with medicines. Take your medicines exactly as prescribed. Call your doctor if you think you are having a problem with your medicine. Antiviral medicine helps you get better faster. · Try not to scratch or pick at the blisters. They will crust over and fall off on their own if you leave them alone. · Put cool, wet cloths on the area to relieve pain and itching. You can also use calamine lotion. Try not to use so much lotion that it cakes and is hard to get off. · Put cornstarch or baking soda on the sores to help dry them out so they heal faster. · Do not use thick ointment, such as petroleum jelly, on the sores. This will keep them from drying and healing. · To help remove loose crusts, soak them in tap water. This can help decrease oozing, and dry and soothe the skin.   · Take an over-the-counter pain medicine, such as acetaminophen (Tylenol), ibuprofen (Advil, Motrin), or naproxen (Aleve). Read and follow all instructions on the label. · Avoid close contact with people until the blisters have healed. It is very important for you to avoid contact with anyone who has never had chickenpox or the chickenpox vaccine. Pregnant women, young babies, and anyone else who has a hard time fighting infection (such as someone with HIV, diabetes, or cancer) is especially at risk. When should you call for help? Call your doctor now or seek immediate medical care if:  ? · You have a new or higher fever. ? · You have a severe headache and a stiff neck. ? · You lose the ability to think clearly. ? · The rash spreads to your forehead, nose, eyes, or eyelids. ? · You have eye pain, or your vision gets worse. ? · You have new pain in your face, or you cannot move the muscles in your face. ? · Blisters spread to new parts of your body. ? Watch closely for changes in your health, and be sure to contact your doctor if:  ? · The rash has not healed after 2 to 4 weeks. ? · You still have pain after the rash has healed. Where can you learn more? Go to http://shonna-richard.info/. Krystal Márquez in the search box to learn more about \"Shingles: Care Instructions. \"  Current as of: March 3, 2017  Content Version: 11.4  © 8241-7797 [employer]. Care instructions adapted under license by [employer] (which disclaims liability or warranty for this information). If you have questions about a medical condition or this instruction, always ask your healthcare professional. [address] any warranty or liability for your use of this information.

## 2018-02-06 NOTE — PROGRESS NOTES
Keisha Molina is a 80 y.o. female (: 1930) presenting to address:    Chief Complaint   Patient presents with    Shingles       Vitals:    18 1605   BP: 131/64   Pulse: 75   Resp: 20   Temp: 97.2 °F (36.2 °C)   TempSrc: Oral   SpO2: 98%   Weight: 89 lb (40.4 kg)   Height: 4' 10\" (1.473 m)   PainSc:   5   PainLoc: Abdomen       Learning Assessment:     Learning Assessment 2015   PRIMARY LEARNER Patient   HIGHEST LEVEL OF EDUCATION - PRIMARY LEARNER  DID NOT GRADUATE HIGH SCHOOL   BARRIERS PRIMARY LEARNER READING   CO-LEARNER CAREGIVER Yes   CO-LEARNER NAME Daughter- Jennifer Dupree HIGHEST LEVEL OF EDUCATION 4 YEARS OF COLLEGE   BARRIERS CO-LEARNER NONE   PRIMARY LANGUAGE CHINESE (MANDARIN)   PRIMARY LANGUAGE CO-LEARNER CHINESE (MANDARIN)    NEED No   LEARNER PREFERENCE PRIMARY PICTURES   LEARNER PREFERENCE CO-LEARNER READING   LEARNING SPECIAL TOPICS none   ANSWERED BY patient   RELATIONSHIP SELF   ASSESSMENT COMMENT n/a     Depression Screening:     PHQ over the last two weeks 2017   Little interest or pleasure in doing things Not at all   Feeling down, depressed or hopeless Not at all   Total Score PHQ 2 0     Fall Risk Assessment:     Fall Risk Assessment, last 12 mths 2017   Able to walk? Yes   Fall in past 12 months? No     Abuse Screening:     Abuse Screening Questionnaire 11/15/2017   Do you ever feel afraid of your partner? N   Are you in a relationship with someone who physically or mentally threatens you? N   Is it safe for you to go home? Y     Coordination of Care Questionaire:   1. Have you been to the ER, urgent care clinic since your last visit? Hospitalized since your last visit? YES. [facility]- ED    2. Have you seen or consulted any other health care providers outside of the [address] [place] [address] since your last visit? Include any pap smears or colon screening. NO    Advanced Directive:   1. Do you have an Advanced Directive? YES    2.  Would you like information on Advanced Directives?  NO

## 2018-02-27 ENCOUNTER — OFFICE VISIT (OUTPATIENT)
Dept: FAMILY MEDICINE CLINIC | Age: 83
End: 2018-02-27

## 2018-02-27 VITALS
HEIGHT: 58 IN | DIASTOLIC BLOOD PRESSURE: 56 MMHG | WEIGHT: 88.4 LBS | SYSTOLIC BLOOD PRESSURE: 110 MMHG | TEMPERATURE: 97.9 F | RESPIRATION RATE: 20 BRPM | HEART RATE: 72 BPM | OXYGEN SATURATION: 98 % | BODY MASS INDEX: 18.56 KG/M2

## 2018-02-27 DIAGNOSIS — I48.20 CHRONIC ATRIAL FIBRILLATION (HCC): ICD-10-CM

## 2018-02-27 DIAGNOSIS — E87.6 HYPOKALEMIA: ICD-10-CM

## 2018-02-27 DIAGNOSIS — I10 ESSENTIAL HYPERTENSION: Primary | ICD-10-CM

## 2018-02-27 DIAGNOSIS — J45.909 PERSISTENT ASTHMA WITHOUT COMPLICATION, UNSPECIFIED ASTHMA SEVERITY: ICD-10-CM

## 2018-02-27 DIAGNOSIS — E03.9 ACQUIRED HYPOTHYROIDISM: ICD-10-CM

## 2018-02-27 DIAGNOSIS — I10 ESSENTIAL HYPERTENSION: ICD-10-CM

## 2018-02-27 RX ORDER — VALSARTAN AND HYDROCHLOROTHIAZIDE 160; 12.5 MG/1; MG/1
1 TABLET, FILM COATED ORAL DAILY
Qty: 90 TAB | Refills: 1 | Status: SHIPPED | OUTPATIENT
Start: 2018-02-27 | End: 2018-02-27 | Stop reason: SDUPTHER

## 2018-02-27 RX ORDER — BUDESONIDE AND FORMOTEROL FUMARATE DIHYDRATE 160; 4.5 UG/1; UG/1
2 AEROSOL RESPIRATORY (INHALATION) 2 TIMES DAILY
Qty: 3 INHALER | Refills: 1 | Status: SHIPPED | OUTPATIENT
Start: 2018-02-27 | End: 2018-03-13 | Stop reason: SDUPTHER

## 2018-02-27 RX ORDER — POTASSIUM CHLORIDE 20 MEQ/1
20 TABLET, EXTENDED RELEASE ORAL DAILY
Qty: 90 TAB | Refills: 1 | Status: SHIPPED | OUTPATIENT
Start: 2018-02-27 | End: 2018-02-27 | Stop reason: SDUPTHER

## 2018-02-27 RX ORDER — VALSARTAN AND HYDROCHLOROTHIAZIDE 160; 12.5 MG/1; MG/1
1 TABLET, FILM COATED ORAL DAILY
Qty: 90 TAB | Refills: 1 | Status: SHIPPED | OUTPATIENT
Start: 2018-02-27 | End: 2018-03-13 | Stop reason: SDUPTHER

## 2018-02-27 RX ORDER — AMLODIPINE BESYLATE 10 MG/1
TABLET ORAL
Qty: 90 TAB | Refills: 1 | Status: SHIPPED | OUTPATIENT
Start: 2018-02-27 | End: 2018-02-27 | Stop reason: SDUPTHER

## 2018-02-27 RX ORDER — LEVOTHYROXINE SODIUM 75 UG/1
75 TABLET ORAL
Qty: 90 TAB | Refills: 1 | Status: SHIPPED | OUTPATIENT
Start: 2018-02-27 | End: 2018-03-13 | Stop reason: SDUPTHER

## 2018-02-27 RX ORDER — AMLODIPINE BESYLATE 10 MG/1
TABLET ORAL
Qty: 90 TAB | Refills: 1 | Status: SHIPPED | OUTPATIENT
Start: 2018-02-27 | End: 2018-06-21 | Stop reason: SDUPTHER

## 2018-02-27 RX ORDER — POTASSIUM CHLORIDE 20 MEQ/1
20 TABLET, EXTENDED RELEASE ORAL DAILY
Qty: 90 TAB | Refills: 1 | Status: SHIPPED | OUTPATIENT
Start: 2018-02-27 | End: 2018-03-13 | Stop reason: SDUPTHER

## 2018-02-27 RX ORDER — LEVOTHYROXINE SODIUM 75 UG/1
75 TABLET ORAL
Qty: 90 TAB | Refills: 1 | Status: SHIPPED | OUTPATIENT
Start: 2018-02-27 | End: 2018-02-27 | Stop reason: SDUPTHER

## 2018-02-27 RX ORDER — BUDESONIDE AND FORMOTEROL FUMARATE DIHYDRATE 160; 4.5 UG/1; UG/1
2 AEROSOL RESPIRATORY (INHALATION) 2 TIMES DAILY
Qty: 3 INHALER | Refills: 1 | Status: SHIPPED | OUTPATIENT
Start: 2018-02-27 | End: 2018-02-27 | Stop reason: SDUPTHER

## 2018-02-27 NOTE — PROGRESS NOTES
Galdino Rodriguez is a 80 y.o. female is here to follow up on having had the shingles. 1. Have you been to the ER, urgent care clinic since your last visit? Hospitalized since your last visit? No    2. Have you seen or consulted any other health care providers outside of the [address] [place] [address] since your last visit? Include any pap smears or colon screening.  No     Health Maintenance Due   Topic Date Due    DTaP/Tdap/Td series (1 - Tdap) 06/22/1951    Pneumococcal 65+ Low/Medium Risk (2 of 2 - PCV13) 05/30/2015    MEDICARE YEARLY EXAM  08/10/2016

## 2018-02-27 NOTE — MR AVS SNAPSHOT
[address] [facility] [address] [phone] Patient: Enrique Baldwin MRN: [id number] NPO:9/95/0895 Visit Information Date & Time Provider Department Dept. Phone Encounter #  
 2/27/2018  3:00 PM Feliciano Flako, [employer] [phone] [id number] Follow-up Instructions Return in about 4 months (around 6/27/2018). Upcoming Health Maintenance Date Due DTaP/Tdap/Td series (1 - Tdap) 6/22/1951 Pneumococcal 65+ Low/Medium Risk (2 of 2 - PCV13) 5/30/2015 MEDICARE YEARLY EXAM 8/10/2016 GLAUCOMA SCREENING Q2Y 4/7/2019 Allergies as of 2/27/2018  Review Complete On: 2/27/2018 By: Lisa Sood LPN No Known Allergies Current Immunizations  Reviewed on 11/11/2017 Name Date Influenza High Dose Vaccine PF 10/3/2017, 10/31/2016, 10/7/2015 Influenza Vaccine 9/22/2014, 10/9/2009 12:00 AM  
 Pneumococcal Polysaccharide (PPSV-23) 5/30/2014 Not reviewed this visit You Were Diagnosed With   
  
 Codes Comments Essential hypertension    -  Primary ICD-10-CM: I10 
ICD-9-CM: 401.9 Acquired hypothyroidism     ICD-10-CM: E03.9 ICD-9-CM: 244.9 Hypokalemia     ICD-10-CM: E87.6 ICD-9-CM: 276.8 Persistent asthma without complication, unspecified asthma severity     ICD-10-CM: J45.909 ICD-9-CM: 493.90 Vitals BP Pulse Temp Resp Height(growth percentile) Weight(growth percentile) 110/56 (BP 1 Location: Right arm, BP Patient Position: Sitting) 72 97.9 °F (36.6 °C) (Oral) 20 4' 10\" (1.473 m) 88 lb 6.4 oz (40.1 kg) SpO2 BMI OB Status Smoking Status 98% 18.48 kg/m2 Postmenopausal Never Smoker Vitals History BMI and BSA Data Body Mass Index Body Surface Area  
 18.48 kg/m 2 1.28 m 2 Preferred Pharmacy Pharmacy Name Phone  [facility] [address] [place] [address], [address] [place] [address] ROAD [phone] Your Updated Medication List  
  
   
This list is accurate as of 2/27/18  3:18 PM.  Always use your most recent med list. amLODIPine 10 mg tablet Commonly known as:  NORVASC  
take 1 tablet by mouth once daily  
  
 budesonide-formoterol 160-4.5 mcg/actuation Hfaa Commonly known as:  SYMBICORT Take 2 Puffs by inhalation two (2) times a day. Cholecalciferol (Vitamin D3) 2,000 unit Cap capsule Commonly known as:  VITAMIN D3 Take  by mouth daily. COUMADIN 1 mg tablet Generic drug:  warfarin Take 2 mg by mouth daily. gabapentin 100 mg capsule Commonly known as:  NEURONTIN Take 1-2 Caps by mouth three (3) times daily as needed. Indications: NEUROPATHIC PAIN  
  
 levothyroxine 75 mcg tablet Commonly known as:  SYNTHROID Take 1 Tab by mouth Daily (before breakfast). montelukast 10 mg tablet Commonly known as:  SINGULAIR Take 1 Tab by mouth daily. potassium chloride 20 mEq tablet Commonly known as:  KLOR-CON M20 Take 1 Tab by mouth daily. PROAIR HFA 90 mcg/actuation inhaler Generic drug:  albuterol  
inhale 2 puffs every 4 hours if needed for wheezing  
  
 valsartan-hydroCHLOROthiazide 160-12.5 mg per tablet Commonly known as:  DIOVAN HCT Take 1 Tab by mouth daily. Prescriptions Sent to Pharmacy Refills  
 potassium chloride (KLOR-CON M20) 20 mEq tablet 1 Sig: Take 1 Tab by mouth daily. Class: Normal  
 Pharmacy: [address] Ph #: [phone] Route: Oral  
 levothyroxine (SYNTHROID) 75 mcg tablet 1 Sig: Take 1 Tab by mouth Daily (before breakfast). Class: Normal  
 Pharmacy: [address] Ph #: [phone] Route: Oral  
 amLODIPine (NORVASC) 10 mg tablet 1 Sig: take 1 tablet by mouth once daily  Class: Normal  
 Pharmacy: [address], [address] [facility] #: [phone]  
 valsartan-hydroCHLOROthiazide (DIOVAN HCT) 160-12.5 mg per tablet 1 Sig: Take 1 Tab by mouth daily. Class: Normal  
 Pharmacy: [facility] [address] [facility] #: [phone] Route: Oral  
 budesonide-formoterol (SYMBICORT) 160-4.5 mcg/actuation HFAA 1 Sig: Take 2 Puffs by inhalation two (2) times a day. Class: Normal  
 Pharmacy: [facility] [address] [facility] [address] [facility] #: [phone] Route: Inhalation Follow-up Instructions Return in about 4 months (around 6/27/2018). To-Do List   
 02/27/2018 Lab:  CBC WITH AUTOMATED DIFF   
  
 02/27/2018 Lab:  METABOLIC PANEL, COMPREHENSIVE   
  
 02/27/2018 Lab:  T4, FREE   
  
 02/27/2018 Lab:  TSH 3RD GENERATION Introducing [facility] & HEALTH SERVICES! Dear Kanu Garland: Thank you for requesting a [employer] account. Our records indicate that you already have an active [employer] account. You can access your account anytime at [email or web address] Did you know that you can access your hospital and ER discharge instructions at any time in [employer]? You can also review all of your test results from your hospital stay or ER visit. Additional Information If you have questions, please visit the Frequently Asked Questions section of the [employer] website at [email or web address] Remember, [employer] is NOT to be used for urgent needs. For medical emergencies, dial 911. Now available from your iPhone and Android! Please provide this summary of care documentation to your next provider. Your primary care clinician is listed as Colten Harden. If you have any questions after today's visit, please call [phone].

## 2018-02-27 NOTE — PROGRESS NOTES
HISTORY OF PRESENT ILLNESS  Mk Aguilar is a 80 y.o. female. HPI  HTN, stable, bp is well controlled on meds daily  Hypothyroid, stable on synthroid daily, last TSh was normal  Hypokalemia, stable on K dur, last K was  Asthma, stable on symbicort BID, no wheezing or sob  S/p shingles 3 weeks ago, much better, her rash resolved, no pain, completed valtrex  Review of Systems   Constitutional: Negative for chills and fever. Respiratory: Negative for cough, shortness of breath and wheezing. Cardiovascular: Negative for chest pain, palpitations and leg swelling. Gastrointestinal: Negative for abdominal pain, melena and nausea. Genitourinary: Negative for dysuria. Musculoskeletal: Negative for falls and myalgias. Neurological: Negative for headaches. Physical Exam   Constitutional: She appears well-developed and well-nourished. Neck: No thyromegaly present. Cardiovascular: Normal rate, regular rhythm and normal heart sounds. No murmur heard. Pulmonary/Chest: Effort normal and breath sounds normal. She has no wheezes. She has no rales. Abdominal: Soft. Bowel sounds are normal. There is no tenderness. Musculoskeletal: She exhibits no edema. Neurological: She is alert. Skin:   Few healed left posterior lower thoracic lesions due to previous shingles   Vitals reviewed. ASSESSMENT and PLAN  Diagnoses and all orders for this visit:    1. Essential hypertension  -     amLODIPine (NORVASC) 10 mg tablet; take 1 tablet by mouth once daily  -     valsartan-hydroCHLOROthiazide (DIOVAN HCT) 160-12.5 mg per tablet; Take 1 Tab by mouth daily.  -     CBC WITH AUTOMATED DIFF; Future  -     METABOLIC PANEL, COMPREHENSIVE; Future    2. Acquired hypothyroidism  -     levothyroxine (SYNTHROID) 75 mcg tablet; Take 1 Tab by mouth Daily (before breakfast). -     TSH 3RD GENERATION; Future  -     T4, FREE; Future    3. Hypokalemia  -     potassium chloride (KLOR-CON M20) 20 mEq tablet;  Take 1 Tab by mouth daily.  -     METABOLIC PANEL, COMPREHENSIVE; Future    4. Persistent asthma without complication, unspecified asthma severity  -     budesonide-formoterol (SYMBICORT) 160-4.5 mcg/actuation HFAA; Take 2 Puffs by inhalation two (2) times a day.     5. Chronic atrial fibrillation ([address] stable, followed and managed by Cardiology, on coumadin

## 2018-03-13 DIAGNOSIS — J45.909 PERSISTENT ASTHMA WITHOUT COMPLICATION, UNSPECIFIED ASTHMA SEVERITY: ICD-10-CM

## 2018-03-13 DIAGNOSIS — E03.9 ACQUIRED HYPOTHYROIDISM: ICD-10-CM

## 2018-03-13 DIAGNOSIS — I10 ESSENTIAL HYPERTENSION: ICD-10-CM

## 2018-03-13 DIAGNOSIS — E87.6 HYPOKALEMIA: ICD-10-CM

## 2018-03-13 RX ORDER — LEVOTHYROXINE SODIUM 75 UG/1
75 TABLET ORAL
Qty: 90 TAB | Refills: 1 | Status: SHIPPED | OUTPATIENT
Start: 2018-03-13 | End: 2018-06-21 | Stop reason: SDUPTHER

## 2018-03-13 RX ORDER — AMLODIPINE BESYLATE 10 MG/1
TABLET ORAL
Qty: 30 TAB | Refills: 3 | Status: SHIPPED | OUTPATIENT
Start: 2018-03-13 | End: 2018-06-21 | Stop reason: SDUPTHER

## 2018-03-13 RX ORDER — BUDESONIDE AND FORMOTEROL FUMARATE DIHYDRATE 160; 4.5 UG/1; UG/1
2 AEROSOL RESPIRATORY (INHALATION) 2 TIMES DAILY
Qty: 3 INHALER | Refills: 1 | Status: SHIPPED | OUTPATIENT
Start: 2018-03-13 | End: 2018-03-14 | Stop reason: SDUPTHER

## 2018-03-13 RX ORDER — POTASSIUM CHLORIDE 20 MEQ/1
20 TABLET, EXTENDED RELEASE ORAL DAILY
Qty: 90 TAB | Refills: 1 | Status: SHIPPED | OUTPATIENT
Start: 2018-03-13 | End: 2018-06-21 | Stop reason: SDUPTHER

## 2018-03-13 RX ORDER — VALSARTAN AND HYDROCHLOROTHIAZIDE 160; 12.5 MG/1; MG/1
1 TABLET, FILM COATED ORAL DAILY
Qty: 90 TAB | Refills: 1 | Status: SHIPPED | OUTPATIENT
Start: 2018-03-13 | End: 2018-06-21 | Stop reason: SDUPTHER

## 2018-03-13 NOTE — TELEPHONE ENCOUNTER
Pt's daughter called in regards to the pt's medication. The pharmacy it was sent to has actually been closed down so she is needing the medication to be sent to [facility] on [facility]. Please advise.

## 2018-03-14 DIAGNOSIS — J45.909 PERSISTENT ASTHMA WITHOUT COMPLICATION, UNSPECIFIED ASTHMA SEVERITY: ICD-10-CM

## 2018-03-14 NOTE — TELEPHONE ENCOUNTER
Requested Prescriptions     Pending Prescriptions Disp Refills    budesonide-formoterol (SYMBICORT) 160-4.5 mcg/actuation HFAA 3 Inhaler 1     Sig: Take 2 Puffs by inhalation two (2) times a day.

## 2018-03-15 RX ORDER — BUDESONIDE AND FORMOTEROL FUMARATE DIHYDRATE 160; 4.5 UG/1; UG/1
2 AEROSOL RESPIRATORY (INHALATION) 2 TIMES DAILY
Qty: 3 INHALER | Refills: 1 | Status: SHIPPED | OUTPATIENT
Start: 2018-03-15 | End: 2018-03-30 | Stop reason: CLARIF

## 2018-03-20 ENCOUNTER — TELEPHONE (OUTPATIENT)
Dept: FAMILY MEDICINE CLINIC | Age: 83
End: 2018-03-20

## 2018-03-20 NOTE — TELEPHONE ENCOUNTER
Received fax from [facility] stating that insurance does not cover Symbicort because it is non formulary. Insurance prefers Advair, Breo or Ellipta. Please advise.

## 2018-06-08 ENCOUNTER — TELEPHONE (OUTPATIENT)
Dept: FAMILY MEDICINE CLINIC | Age: 83
End: 2018-06-08

## 2018-06-08 ENCOUNTER — HOSPITAL ENCOUNTER (OUTPATIENT)
Dept: LAB | Age: 83
Discharge: HOME | End: 2018-06-08
Payer: MEDICARE

## 2018-06-08 DIAGNOSIS — E03.9 ACQUIRED HYPOTHYROIDISM: ICD-10-CM

## 2018-06-08 DIAGNOSIS — I10 ESSENTIAL HYPERTENSION: ICD-10-CM

## 2018-06-08 DIAGNOSIS — E87.6 HYPOKALEMIA: ICD-10-CM

## 2018-06-08 LAB
ALBUMIN SERPL-MCNC: 3.6 G/DL (ref 3.4–5)
ALBUMIN/GLOB SERPL: 1.2 {RATIO} (ref 0.8–1.7)
ALP SERPL-CCNC: 79 U/L (ref 45–117)
ALT SERPL-CCNC: 16 U/L (ref 13–56)
ANION GAP SERPL CALC-SCNC: 7 MMOL/L (ref 3–18)
AST SERPL-CCNC: 19 U/L (ref 15–37)
BASOPHILS # BLD: 0 K/UL (ref 0–0.06)
BASOPHILS NFR BLD: 1 % (ref 0–2)
BILIRUB SERPL-MCNC: 0.8 MG/DL (ref 0.2–1)
BUN SERPL-MCNC: 27 MG/DL (ref 7–18)
BUN/CREAT SERPL: 27 (ref 12–20)
CALCIUM SERPL-MCNC: 8.5 MG/DL (ref 8.5–10.1)
CHLORIDE SERPL-SCNC: 103 MMOL/L (ref 100–108)
CO2 SERPL-SCNC: 31 MMOL/L (ref 21–32)
CREAT SERPL-MCNC: 1.01 MG/DL (ref 0.6–1.3)
DIFFERENTIAL METHOD BLD: ABNORMAL
EOSINOPHIL # BLD: 0.2 K/UL (ref 0–0.4)
EOSINOPHIL NFR BLD: 5 % (ref 0–5)
ERYTHROCYTE [DISTWIDTH] IN BLOOD BY AUTOMATED COUNT: 16 % (ref 11.6–14.5)
GLOBULIN SER CALC-MCNC: 2.9 G/DL (ref 2–4)
GLUCOSE SERPL-MCNC: 116 MG/DL (ref 74–99)
HCT VFR BLD AUTO: 32.4 % (ref 35–45)
HGB BLD-MCNC: 10.1 G/DL (ref 12–16)
LYMPHOCYTES # BLD: 1 K/UL (ref 0.9–3.6)
LYMPHOCYTES NFR BLD: 23 % (ref 21–52)
MCH RBC QN AUTO: 20.3 PG (ref 24–34)
MCHC RBC AUTO-ENTMCNC: 31.2 G/DL (ref 31–37)
MCV RBC AUTO: 65.1 FL (ref 74–97)
MONOCYTES # BLD: 0.2 K/UL (ref 0.05–1.2)
MONOCYTES NFR BLD: 5 % (ref 3–10)
NEUTS SEG # BLD: 2.8 K/UL (ref 1.8–8)
NEUTS SEG NFR BLD: 66 % (ref 40–73)
PLATELET # BLD AUTO: 161 K/UL (ref 135–420)
POTASSIUM SERPL-SCNC: 3.7 MMOL/L (ref 3.5–5.5)
PROT SERPL-MCNC: 6.5 G/DL (ref 6.4–8.2)
RBC # BLD AUTO: 4.98 M/UL (ref 4.2–5.3)
SODIUM SERPL-SCNC: 141 MMOL/L (ref 136–145)
T4 FREE SERPL-MCNC: 1.7 NG/DL (ref 0.7–1.5)
TSH SERPL DL<=0.05 MIU/L-ACNC: 0.6 UIU/ML (ref 0.36–3.74)
WBC # BLD AUTO: 4.2 K/UL (ref 4.6–13.2)

## 2018-06-08 PROCEDURE — 80053 COMPREHEN METABOLIC PANEL: CPT | Performed by: INTERNAL MEDICINE

## 2018-06-08 PROCEDURE — 85025 COMPLETE CBC W/AUTO DIFF WBC: CPT | Performed by: INTERNAL MEDICINE

## 2018-06-08 PROCEDURE — 84439 ASSAY OF FREE THYROXINE: CPT | Performed by: INTERNAL MEDICINE

## 2018-06-08 PROCEDURE — 36415 COLL VENOUS BLD VENIPUNCTURE: CPT | Performed by: INTERNAL MEDICINE

## 2018-06-08 PROCEDURE — 84443 ASSAY THYROID STIM HORMONE: CPT | Performed by: INTERNAL MEDICINE

## 2018-06-08 NOTE — TELEPHONE ENCOUNTER
Patients daughter would like to speak with the providers nurse about her mothers health. Daughter did not go into detail but is requesting to have a phone call from his nurse. Please call the daughter once the lab results have been received.

## 2018-06-21 ENCOUNTER — OFFICE VISIT (OUTPATIENT)
Dept: FAMILY MEDICINE CLINIC | Age: 83
End: 2018-06-21

## 2018-06-21 VITALS
BODY MASS INDEX: 18.39 KG/M2 | WEIGHT: 87.6 LBS | DIASTOLIC BLOOD PRESSURE: 71 MMHG | HEART RATE: 73 BPM | RESPIRATION RATE: 16 BRPM | SYSTOLIC BLOOD PRESSURE: 138 MMHG | HEIGHT: 58 IN | TEMPERATURE: 97.6 F

## 2018-06-21 DIAGNOSIS — I10 ESSENTIAL HYPERTENSION: Primary | ICD-10-CM

## 2018-06-21 DIAGNOSIS — J45.909 PERSISTENT ASTHMA WITHOUT COMPLICATION, UNSPECIFIED ASTHMA SEVERITY: ICD-10-CM

## 2018-06-21 DIAGNOSIS — E87.6 HYPOKALEMIA: ICD-10-CM

## 2018-06-21 DIAGNOSIS — E03.9 ACQUIRED HYPOTHYROIDISM: ICD-10-CM

## 2018-06-21 RX ORDER — ALBUTEROL SULFATE 90 UG/1
AEROSOL, METERED RESPIRATORY (INHALATION)
Qty: 8.5 INHALER | Refills: 5 | Status: SHIPPED | OUTPATIENT
Start: 2018-06-21 | End: 2019-10-27 | Stop reason: SDUPTHER

## 2018-06-21 RX ORDER — POTASSIUM CHLORIDE 20 MEQ/1
20 TABLET, EXTENDED RELEASE ORAL DAILY
Qty: 90 TAB | Refills: 1 | Status: SHIPPED | OUTPATIENT
Start: 2018-06-21 | End: 2019-04-29 | Stop reason: SDUPTHER

## 2018-06-21 RX ORDER — LEVOTHYROXINE SODIUM 75 UG/1
75 TABLET ORAL
Qty: 90 TAB | Refills: 1 | Status: SHIPPED | OUTPATIENT
Start: 2018-06-21 | End: 2018-12-14 | Stop reason: SDUPTHER

## 2018-06-21 RX ORDER — VALSARTAN AND HYDROCHLOROTHIAZIDE 160; 12.5 MG/1; MG/1
1 TABLET, FILM COATED ORAL DAILY
Qty: 90 TAB | Refills: 1 | Status: SHIPPED | OUTPATIENT
Start: 2018-06-21 | End: 2018-12-14 | Stop reason: SDUPTHER

## 2018-06-21 RX ORDER — AMLODIPINE BESYLATE 10 MG/1
TABLET ORAL
Qty: 90 TAB | Refills: 1 | Status: SHIPPED | OUTPATIENT
Start: 2018-06-21 | End: 2018-12-14 | Stop reason: SDUPTHER

## 2018-06-21 NOTE — PROGRESS NOTES
Zahida Rios is a 80 y.o. female (: 1930) presenting to address:    Chief Complaint   Patient presents with    Medication Evaluation     4 month follow up       Vitals:    18 1011 18 1017   BP: 148/62 138/71   Pulse: 73    Temp: 97.6 °F (36.4 °C)    TempSrc: Oral    Weight: 87 lb 9.6 oz (39.7 kg)    Height: 4' 10\" (1.473 m)    PainSc:   0 - No pain        Hearing/Vision:   No exam data present    Learning Assessment:     Learning Assessment 2015   PRIMARY LEARNER Patient   HIGHEST LEVEL OF EDUCATION - PRIMARY LEARNER  DID NOT GRADUATE HIGH SCHOOL   BARRIERS PRIMARY LEARNER READING   CO-LEARNER CAREGIVER Yes   CO-LEARNER NAME Daughter- [address] LEVEL OF EDUCATION 4 YEARS OF [address] (MANDARIN)   PRIMARY LANGUAGE CO-LEARNER CHINESE (MANDARIN)    NEED No   LEARNER PREFERENCE PRIMARY PICTURES   LEARNER PREFERENCE CO-LEARNER READING   LEARNING SPECIAL TOPICS none   ANSWERED BY patient   RELATIONSHIP SELF   ASSESSMENT COMMENT n/a     Depression Screening:     PHQ over the last two weeks 2018   Little interest or pleasure in doing things Not at all   Feeling down, depressed or hopeless Several days   Total Score PHQ 2 1     Fall Risk Assessment:     Fall Risk Assessment, last 12 mths 2018   Able to walk? Yes   Fall in past 12 months? -     Abuse Screening:     Abuse Screening Questionnaire 11/15/2017   Do you ever feel afraid of your partner? N   Are you in a relationship with someone who physically or mentally threatens you? N   Is it safe for you to go home? Y     Coordination of Care Questionaire:   1. Have you been to the ER, urgent care clinic since your last visit? Hospitalized since your last visit? NO    2. Have you seen or consulted any other health care providers outside of the [address] [place] [address] since your last visit? Include any pap smears or colon screening.  YES  Had surgery for cataract one month ago    Advanced Directive:   1. Do you have an Advanced Directive? YES    2. Would you like information on Advanced Directives?  NO

## 2018-06-21 NOTE — MR AVS SNAPSHOT
[address] [place] [address]  [address] [facility] [address] 
[phone] Patient: Marika Rogers MRN: [id number] CARL:7/33/[id number] Visit Information Date & Time Provider Department Dept. Phone Encounter #  
 6/21/2018 10:00 AM Willem Campbell [place] [phone] [id number] Follow-up Instructions Return in about 4 months (around 10/21/2018). Upcoming Health Maintenance Date Due DTaP/Tdap/Td series (1 - Tdap) 6/22/1951 Pneumococcal 65+ Low/Medium Risk (2 of 2 - PCV13) 5/30/2015 MEDICARE YEARLY EXAM 3/14/2018 Influenza Age 5 to Adult 8/1/2018 GLAUCOMA SCREENING Q2Y 4/18/2020 Allergies as of 6/21/2018  Review Complete On: 6/21/2018 By: Chanel Reese MD  
 No Known Allergies Current Immunizations  Reviewed on 11/11/2017 Name Date Influenza High Dose Vaccine PF 10/3/2017, 10/31/2016, 10/7/2015 Influenza Vaccine 9/22/2014, 10/9/2009 12:00 AM  
 Pneumococcal Polysaccharide (PPSV-23) 5/30/2014 Not reviewed this visit You Were Diagnosed With   
  
 Codes Comments Essential hypertension    -  Primary ICD-10-CM: I10 
ICD-9-CM: 401.9 Hypokalemia     ICD-10-CM: E87.6 ICD-9-CM: 276.8 Acquired hypothyroidism     ICD-10-CM: E03.9 ICD-9-CM: 244.9 Persistent asthma without complication, unspecified asthma severity     ICD-10-CM: J45.909 ICD-9-CM: 493.90 Vitals BP Pulse Temp Resp Height(growth percentile) Weight(growth percentile) 138/71 73 97.6 °F (36.4 °C) (Oral) 16 4' 10\" (1.473 m) 87 lb 9.6 oz (39.7 kg) LMP BMI OB Status Smoking Status (LMP Unknown) 18.31 kg/m2 Postmenopausal Never Smoker Vitals History BMI and BSA Data Body Mass Index Body Surface Area  
 18.31 kg/m 2 1.27 m 2 Preferred Pharmacy Pharmacy Name Phone  [id number] [address] [place] [address] [facility] [phone] Your Updated Medication List  
  
   
This list is accurate as of 6/21/18 10:47 AM.  Always use your most recent med list.  
  
  
  
  
 albuterol 90 mcg/actuation inhaler Commonly known as:  PROAIR HFA  
inhale 2 puffs every 4 hours if needed for wheezing  
  
 amLODIPine 10 mg tablet Commonly known as:  NORVASC  
take 1 tablet by mouth once daily Cholecalciferol (Vitamin D3) 2,000 unit Cap capsule Commonly known as:  VITAMIN D3 Take  by mouth daily. COUMADIN 1 mg tablet Generic drug:  warfarin Take 2 mg by mouth daily. fluticasone-salmeterol 115-21 mcg/actuation inhaler Commonly known as:  ADVAIR HFA Take 2 Puffs by inhalation two (2) times a day. levothyroxine 75 mcg tablet Commonly known as:  SYNTHROID Take 1 Tab by mouth Daily (before breakfast). potassium chloride 20 mEq tablet Commonly known as:  KLOR-CON M20 Take 1 Tab by mouth daily. valsartan-hydroCHLOROthiazide 160-12.5 mg per tablet Commonly known as:  DIOVAN HCT Take 1 Tab by mouth daily. Prescriptions Sent to Pharmacy Refills  
 amLODIPine (NORVASC) 10 mg tablet 1 Sig: take 1 tablet by mouth once daily Class: Normal  
 [facility] [address] [place] [address] Ph #: [phone]  
 potassium chloride (KLOR-CON M20) 20 mEq tablet 1 Sig: Take 1 Tab by mouth daily. Class: Normal  
 [facility] [address] [place] [address] Ph #: [phone] Route: Oral  
 levothyroxine (SYNTHROID) 75 mcg tablet 1 Sig: Take 1 Tab by mouth Daily (before breakfast). Class: Normal  
 [facility] [address] [place] [address] Ph #: [phone] Route: Oral  
 valsartan-hydroCHLOROthiazide (DIOVAN HCT) 160-12.5 mg per tablet 1 Sig: Take 1 Tab by mouth daily.   
 Class: Normal  
 [facility]: [facility] [address] [place] [address] Ph #: [phone] Route: Oral  
 albuterol (PROAIR HFA) 90 mcg/actuation inhaler 5 Sig: inhale 2 puffs every 4 hours if needed for wheezing Class: Normal  
 [facility]: [facility] [address] [place] [address] Ph #: [phone]  
 fluticasone-salmeterol (ADVAIR HFA) 115-21 mcg/actuation inhaler 1 Sig: Take 2 Puffs by inhalation two (2) times a day. Class: Normal  
 [facility] [address] [place] [address] Ph #: [phone] Route: Inhalation Follow-up Instructions Return in about 4 months (around 10/21/2018). Introducing [facility] SERVICES! Dear Anthony Segundo: Thank you for requesting a Knowledgestreem account. Our records indicate that you already have an active Knowledgestreem account. You can access your account anytime at [email or web address] Did you know that you can access your hospital and ER discharge instructions at any time in Knowledgestreem? You can also review all of your test results from your hospital stay or ER visit. Additional Information If you have questions, please visit the Frequently Asked Questions section of the Knowledgestreem website at [email or web address] Remember, Knowledgestreem is NOT to be used for urgent needs. For medical emergencies, dial 911. Now available from your iPhone and Android! Please provide this summary of care documentation to your next provider. Your primary care clinician is listed as Erika Hewitt. If you have any questions after today's visit, please call [phone].

## 2018-06-21 NOTE — TELEPHONE ENCOUNTER
Patient was seen today and all questions were addressed.     Future Appointments  Date Time Provider Mireille Paige   10/22/2018 2:30 PM Willow Oliver MD [facility]

## 2018-06-21 NOTE — PROGRESS NOTES
HISTORY OF PRESENT ILLNESS  Mk Warren is a 80 y.o. female. HPI  HTN, stable, bp is well controlled on med daily  Hypokalemia, stable on Klor con daily  Hypothyroid, stable on synthroid daily  Asthma, stable on Advair, use albuterol occasionally  A fib, stable, followed by Cardiology  Review of Systems   Constitutional: Negative for fever. Respiratory: Negative for cough, shortness of breath and wheezing. Cardiovascular: Negative for chest pain, palpitations and leg swelling. Gastrointestinal: Negative for abdominal pain and nausea. Genitourinary: Negative for dysuria. Musculoskeletal: Negative for myalgias. Neurological: Negative for headaches. Physical Exam   Constitutional: She appears well-developed and well-nourished. No distress. Neck: No thyromegaly present. Cardiovascular: Normal rate and normal heart sounds. An irregularly irregular rhythm present. No murmur heard. Pulmonary/Chest: Effort normal and breath sounds normal. She has no wheezes. She has no rales. Abdominal: Soft. Bowel sounds are normal. There is no tenderness. Musculoskeletal: She exhibits no edema. Neurological: She is alert. Vitals reviewed. ASSESSMENT and PLAN  Diagnoses and all orders for this visit:    1. Essential hypertension, stable  -     amLODIPine (NORVASC) 10 mg tablet; take 1 tablet by mouth once daily  -     valsartan-hydroCHLOROthiazide (DIOVAN HCT) 160-12.5 mg per tablet; Take 1 Tab by mouth daily. 2. Hypokalemia, stable  -     potassium chloride (KLOR-CON M20) 20 mEq tablet; Take 1 Tab by mouth daily. 3. Acquired hypothyroidism, stable  -     levothyroxine (SYNTHROID) 75 mcg tablet; Take 1 Tab by mouth Daily (before breakfast).     4. Persistent asthma without complication, unspecified asthma severity, stable  -     albuterol (PROAIR HFA) 90 mcg/actuation inhaler; inhale 2 puffs every 4 hours if needed for wheezing  -     fluticasone-salmeterol (ADVAIR HFA) 115-21 mcg/actuation inhaler; Take 2 Puffs by inhalation two (2) times a day. Lab Results   Component Value Date/Time    TSH 0.60 06/08/2018 02:23 PM     Lab Results   Component Value Date/Time    Sodium 141 06/08/2018 02:23 PM    Potassium 3.7 06/08/2018 02:23 PM    Chloride 103 06/08/2018 02:23 PM    CO2 31 06/08/2018 02:23 PM    Anion gap 7 06/08/2018 02:23 PM    Glucose 116 (H) 06/08/2018 02:23 PM    BUN 27 (H) 06/08/2018 02:23 PM    Creatinine 1.01 06/08/2018 02:23 PM    BUN/Creatinine ratio 27 (H) 06/08/2018 02:23 PM    GFR est AA >60 06/08/2018 02:23 PM    GFR est non-AA 52 (L) 06/08/2018 02:23 PM    Calcium 8.5 06/08/2018 02:23 PM    Bilirubin, total 0.8 06/08/2018 02:23 PM    AST (SGOT) 19 06/08/2018 02:23 PM    Alk.  phosphatase 79 06/08/2018 02:23 PM    Protein, total 6.5 06/08/2018 02:23 PM    Albumin 3.6 06/08/2018 02:23 PM    Globulin 2.9 06/08/2018 02:23 PM    A-G Ratio 1.2 06/08/2018 02:23 PM    ALT (SGPT) 16 06/08/2018 02:23 PM   rtc 4 mos

## 2018-07-19 ENCOUNTER — OFFICE VISIT (OUTPATIENT)
Dept: FAMILY MEDICINE CLINIC | Age: 83
End: 2018-07-19

## 2018-07-19 ENCOUNTER — HOSPITAL ENCOUNTER (OUTPATIENT)
Dept: LAB | Age: 83
Discharge: HOME | End: 2018-07-19
Payer: MEDICARE

## 2018-07-19 VITALS
SYSTOLIC BLOOD PRESSURE: 102 MMHG | WEIGHT: 84.4 LBS | HEIGHT: 58 IN | TEMPERATURE: 98.3 F | HEART RATE: 71 BPM | BODY MASS INDEX: 17.71 KG/M2 | DIASTOLIC BLOOD PRESSURE: 48 MMHG | OXYGEN SATURATION: 96 % | RESPIRATION RATE: 16 BRPM

## 2018-07-19 DIAGNOSIS — R10.84 GENERALIZED ABDOMINAL PAIN: ICD-10-CM

## 2018-07-19 DIAGNOSIS — R10.84 GENERALIZED ABDOMINAL PAIN: Primary | ICD-10-CM

## 2018-07-19 LAB
ALBUMIN SERPL-MCNC: 3.2 G/DL (ref 3.4–5)
ALBUMIN/GLOB SERPL: 1 {RATIO} (ref 0.8–1.7)
ALP SERPL-CCNC: 48 U/L (ref 45–117)
ALT SERPL-CCNC: 16 U/L (ref 13–56)
AMYLASE SERPL-CCNC: 68 U/L (ref 25–115)
ANION GAP SERPL CALC-SCNC: 5 MMOL/L (ref 3–18)
APPEARANCE UR: CLEAR
AST SERPL-CCNC: 17 U/L (ref 15–37)
BACTERIA URNS QL MICRO: NEGATIVE /HPF
BASOPHILS # BLD: 0 K/UL (ref 0–0.1)
BASOPHILS NFR BLD: 0 % (ref 0–2)
BILIRUB SERPL-MCNC: 1.7 MG/DL (ref 0.2–1)
BILIRUB UR QL: NEGATIVE
BUN SERPL-MCNC: 22 MG/DL (ref 7–18)
BUN/CREAT SERPL: 20 (ref 12–20)
CALCIUM SERPL-MCNC: 8.3 MG/DL (ref 8.5–10.1)
CHLORIDE SERPL-SCNC: 103 MMOL/L (ref 100–108)
CO2 SERPL-SCNC: 31 MMOL/L (ref 21–32)
COLOR UR: YELLOW
CREAT SERPL-MCNC: 1.09 MG/DL (ref 0.6–1.3)
DIFFERENTIAL METHOD BLD: ABNORMAL
EOSINOPHIL # BLD: 0.1 K/UL (ref 0–0.4)
EOSINOPHIL NFR BLD: 1 % (ref 0–5)
EPITH CASTS URNS QL MICRO: NORMAL /LPF (ref 0–5)
ERYTHROCYTE [DISTWIDTH] IN BLOOD BY AUTOMATED COUNT: 16.4 % (ref 11.6–14.5)
GLOBULIN SER CALC-MCNC: 3.3 G/DL (ref 2–4)
GLUCOSE SERPL-MCNC: 108 MG/DL (ref 74–99)
GLUCOSE UR STRIP.AUTO-MCNC: NEGATIVE MG/DL
HCT VFR BLD AUTO: 29.9 % (ref 35–45)
HGB BLD-MCNC: 9.6 G/DL (ref 12–16)
HGB UR QL STRIP: ABNORMAL
KETONES UR QL STRIP.AUTO: NEGATIVE MG/DL
LEUKOCYTE ESTERASE UR QL STRIP.AUTO: ABNORMAL
LIPASE SERPL-CCNC: 77 U/L (ref 73–393)
LYMPHOCYTES # BLD: 0.7 K/UL (ref 0.9–3.6)
LYMPHOCYTES NFR BLD: 12 % (ref 21–52)
MCH RBC QN AUTO: 20.3 PG (ref 24–34)
MCHC RBC AUTO-ENTMCNC: 32.1 G/DL (ref 31–37)
MCV RBC AUTO: 63.2 FL (ref 74–97)
MONOCYTES # BLD: 0.2 K/UL (ref 0.05–1.2)
MONOCYTES NFR BLD: 3 % (ref 3–10)
NEUTS SEG # BLD: 4.7 K/UL (ref 1.8–8)
NEUTS SEG NFR BLD: 84 % (ref 40–73)
NITRITE UR QL STRIP.AUTO: NEGATIVE
PH UR STRIP: 5 [PH] (ref 5–8)
PLATELET # BLD AUTO: 165 K/UL (ref 135–420)
POTASSIUM SERPL-SCNC: 3.1 MMOL/L (ref 3.5–5.5)
PROT SERPL-MCNC: 6.5 G/DL (ref 6.4–8.2)
PROT UR STRIP-MCNC: NEGATIVE MG/DL
RBC # BLD AUTO: 4.73 M/UL (ref 4.2–5.3)
RBC #/AREA URNS HPF: NEGATIVE /HPF (ref 0–5)
SODIUM SERPL-SCNC: 139 MMOL/L (ref 136–145)
SP GR UR REFRACTOMETRY: 1.02 (ref 1–1.03)
UROBILINOGEN UR QL STRIP.AUTO: 1 EU/DL (ref 0.2–1)
WBC # BLD AUTO: 5.7 K/UL (ref 4.6–13.2)
WBC URNS QL MICRO: NORMAL /HPF (ref 0–4)

## 2018-07-19 PROCEDURE — 36415 COLL VENOUS BLD VENIPUNCTURE: CPT | Performed by: INTERNAL MEDICINE

## 2018-07-19 PROCEDURE — 82150 ASSAY OF AMYLASE: CPT | Performed by: INTERNAL MEDICINE

## 2018-07-19 PROCEDURE — 85025 COMPLETE CBC W/AUTO DIFF WBC: CPT | Performed by: INTERNAL MEDICINE

## 2018-07-19 PROCEDURE — 83690 ASSAY OF LIPASE: CPT | Performed by: INTERNAL MEDICINE

## 2018-07-19 PROCEDURE — 80053 COMPREHEN METABOLIC PANEL: CPT | Performed by: INTERNAL MEDICINE

## 2018-07-19 PROCEDURE — 81001 URINALYSIS AUTO W/SCOPE: CPT | Performed by: INTERNAL MEDICINE

## 2018-07-19 RX ORDER — OMEPRAZOLE 40 MG/1
40 CAPSULE, DELAYED RELEASE ORAL DAILY
Qty: 30 CAP | Refills: 1 | Status: SHIPPED | OUTPATIENT
Start: 2018-07-19 | End: 2018-12-14 | Stop reason: SDUPTHER

## 2018-07-19 RX ORDER — POLYETHYLENE GLYCOL 3350 17 G/17G
17 POWDER, FOR SOLUTION ORAL DAILY
Qty: 510 G | Refills: 0 | Status: SHIPPED | OUTPATIENT
Start: 2018-07-19

## 2018-07-19 NOTE — PROGRESS NOTES
HISTORY OF PRESENT ILLNESS  Mk Oconnor is a 80 y.o. female. HPI  C/o abdominal pain, started 2 days ago, 8/10,improved today to 4/10, no N/V, no melena, she has been constipated for 3 days  Review of Systems   Constitutional: Negative for chills and fever. Gastrointestinal: Positive for constipation. Negative for blood in stool, diarrhea, melena, nausea and vomiting. Physical Exam   Abdominal: There is no hepatosplenomegaly. There is tenderness (worst in the epigastric area) in the right upper quadrant, right lower quadrant, epigastric area, periumbilical area, suprapubic area, left upper quadrant and left lower quadrant. There is no rigidity, no rebound, no guarding, no CVA tenderness, no tenderness at McBurney's point and negative Bergman's sign. No hernia. Vitals reviewed. ASSESSMENT and PLAN  Diagnoses and all orders for this visit:    1. Generalized abdominal pain  -     US ABD COMP; Future  -     omeprazole (PRILOSEC) 40 mg capsule; Take 1 Cap by mouth daily. -     polyethylene glycol (MIRALAX) 17 gram/dose powder; Take 17 g by mouth daily.  -     CBC WITH AUTOMATED DIFF; Future  -     METABOLIC PANEL, COMPREHENSIVE; Future  -     LIPASE; Future  -     AMYLASE; Future  -     URINALYSIS W/ RFLX MICROSCOPIC; Future  -     OCCULT BLOOD, IMMUNOASSAY (FIT);  Future    rtc 1 week  Advised her daughter to take her to the ER if symptoms worsens

## 2018-07-19 NOTE — MR AVS SNAPSHOT
[address] [facility] 
 
 
 [address] [facility] [id number] [phone] Patient: Juan M Bright MRN: [id number] QWC:9/71/2254 Visit Information Date & Time Provider Department Dept. Phone Encounter #  
 7/19/2018  8:15 AM Charlotte Rosario, 3 [facility] [phone] [id number] Follow-up Instructions Return in about 1 week (around 7/26/2018). Your Appointments 10/22/2018  2:30 PM  
Follow Up with Charlotte Rosario MD  
3 [facility]) Appt Note: 4 month f/u; fdgd [address] [facility] [address] [phone]  
  
   
 [address] Upcoming Health Maintenance Date Due DTaP/Tdap/Td series (1 - Tdap) 6/22/1951 Bone Densitometry (Dexa) Screening 6/22/1995 Pneumococcal 65+ Low/Medium Risk (2 of 2 - PCV13) 5/30/2015 MEDICARE YEARLY EXAM 3/14/2018 Influenza Age 5 to Adult 8/1/2018 GLAUCOMA SCREENING Q2Y 4/18/2020 Allergies as of 7/19/2018  Review Complete On: 7/19/2018 By: Fiordaliza Fitzgerald LPN No Known Allergies Current Immunizations  Reviewed on 11/11/2017 Name Date Influenza High Dose Vaccine PF 10/3/2017, 10/31/2016, 10/7/2015 Influenza Vaccine 9/22/2014, 10/9/2009 12:00 AM  
 Pneumococcal Polysaccharide (PPSV-23) 5/30/2014 Not reviewed this visit You Were Diagnosed With   
  
 Codes Comments Generalized abdominal pain    -  Primary ICD-10-CM: R10.84 ICD-9-CM: 789.07 Vitals BP Pulse Temp Resp Height(growth percentile) Weight(growth percentile) 102/48 71 98.3 °F (36.8 °C) (Oral) 16 4' 10\" (1.473 m) 84 lb 6.4 oz (38.3 kg) LMP SpO2 BMI OB Status Smoking Status (LMP Unknown) 96% 17.64 kg/m2 Postmenopausal Never Smoker BMI and BSA Data Body Mass Index Body Surface Area  
 17.64 kg/m 2 1.25 m 2 Preferred Pharmacy Pharmacy Name Phone [facility] [address] [place] [address] [phone] Your Updated Medication List  
  
   
This list is accurate as of 7/19/18  8:52 AM.  Always use your most recent med list.  
  
  
  
  
 albuterol 90 mcg/actuation inhaler Commonly known as:  PROAIR HFA  
inhale 2 puffs every 4 hours if needed for wheezing  
  
 amLODIPine 10 mg tablet Commonly known as:  NORVASC  
take 1 tablet by mouth once daily Cholecalciferol (Vitamin D3) 2,000 unit Cap capsule Commonly known as:  VITAMIN D3 Take  by mouth daily. COUMADIN 1 mg tablet Generic drug:  warfarin Take 2 mg by mouth daily. fluticasone-salmeterol 115-21 mcg/actuation inhaler Commonly known as:  ADVAIR HFA Take 2 Puffs by inhalation two (2) times a day. levothyroxine 75 mcg tablet Commonly known as:  SYNTHROID Take 1 Tab by mouth Daily (before breakfast). omeprazole 40 mg capsule Commonly known as:  PRILOSEC Take 1 Cap by mouth daily. polyethylene glycol 17 gram/dose powder Commonly known as:  Teresa Brie Take 17 g by mouth daily. potassium chloride 20 mEq tablet Commonly known as:  KLOR-CON M20 Take 1 Tab by mouth daily. valsartan-hydroCHLOROthiazide 160-12.5 mg per tablet Commonly known as:  DIOVAN HCT Take 1 Tab by mouth daily. Prescriptions Sent to Pharmacy Refills  
 omeprazole (PRILOSEC) 40 mg capsule 1 Sig: Take 1 Cap by mouth daily. Class: Normal  
 [facility]acy: [facility] [address] [place] [address] Ph #: [phone] Route: Oral  
 polyethylene glycol (MIRALAX) 17 gram/dose powder 0 Sig: Take 17 g by mouth daily. Class: Normal  
 Pharmacy: [facility] Ph #: [phone] Route: Oral  
  
Follow-up Instructions Return in about 1 week (around 7/26/2018). To-Do List   
 07/19/2018 Lab:  AMYLASE   
  
 07/19/2018 Lab:  CBC WITH AUTOMATED DIFF   
  
 07/19/2018 Lab:  LIPASE   
  
 07/19/2018 Lab:  METABOLIC PANEL, COMPREHENSIVE   
  
 07/19/2018 Lab:  URINALYSIS W/ RFLX MICROSCOPIC   
  
 07/27/2018 Imaging:  US ABD COMP Introducing \[facility]\"" & HEALTH SERVICES! Dear Merle Petit: Thank you for requesting a Talent Flush account. Our records indicate that you already have an active Talent Flush account. You can access your account anytime at [email or web address] Did you know that you can access your hospital and ER discharge instructions at any time in Talent Flush? You can also review all of your test results from your hospital stay or ER visit. Additional Information If you have questions, please visit the Frequently Asked Questions section of the Talent Flush website at [email or web address] Remember, Talent Flush is NOT to be used for urgent needs. For medical emergencies, dial 911. Now available from your iPhone and Android! Please provide this summary of care documentation to your next provider. Your primary care clinician is listed as Daniel Daniels. If you have any questions after today's visit, please call [phone].

## 2018-07-19 NOTE — PROGRESS NOTES
Sheryl Estrada is a 80 y.o. female (: 1930) presenting to address:    Chief Complaint   Patient presents with    Abdominal Pain     bloating and abdominal pain times times two days       Vitals:    18 0817   BP: 102/48   Pulse: 71   Resp: 16   Temp: 98.3 °F (36.8 °C)   TempSrc: Oral   SpO2: 96%   Weight: 84 lb 6.4 oz (38.3 kg)   Height: 4' 10\" (1.473 m)   PainSc:   4   PainLoc: Abdomen       Hearing/Vision:   No exam data present    Learning Assessment:     Learning Assessment 2015   PRIMARY LEARNER Patient   HIGHEST LEVEL OF EDUCATION - PRIMARY LEARNER  DID NOT GRADUATE HIGH SCHOOL   BARRIERS PRIMARY LEARNER READING   CO-LEARNER CAREGIVER Yes   CO-LEARNER NAME Daughter- [address] LEVEL OF EDUCATION 4 YEARS OF COLLEGE   BARRIERS CO-LEARNER NONE   PRIMARY LANGUAGE CHINESE (MANDARIN)   PRIMARY LANGUAGE CO-LEARNER CHINESE (MANDARIN)    NEED No   LEARNER PREFERENCE PRIMARY PICTURES   LEARNER PREFERENCE CO-LEARNER READING   LEARNING SPECIAL TOPICS none   ANSWERED BY patient   RELATIONSHIP SELF   ASSESSMENT COMMENT n/a     Depression Screening:     PHQ over the last two weeks 2018   Little interest or pleasure in doing things Not at all   Feeling down, depressed, irritable, or hopeless Not at all   Total Score PHQ 2 0     Fall Risk Assessment:     Fall Risk Assessment, last 12 mths 2018   Able to walk? Yes   Fall in past 12 months? No     Abuse Screening:     Abuse Screening Questionnaire 11/15/2017   Do you ever feel afraid of your partner? N   Are you in a relationship with someone who physically or mentally threatens you? N   Is it safe for you to go home? Y     Coordination of Care Questionaire:   1. Have you been to the ER, urgent care clinic since your last visit? Hospitalized since your last visit? NO    2. Have you seen or consulted any other health care providers outside of the [address] [place] [address] since your last visit?   Include any pap smears or colon screening. NO    Advanced Directive:   1. Do you have an Advanced Directive? NO    2. Would you like information on Advanced Directives?  NO

## 2018-07-26 ENCOUNTER — OFFICE VISIT (OUTPATIENT)
Dept: FAMILY MEDICINE CLINIC | Age: 83
End: 2018-07-26

## 2018-07-26 VITALS
HEIGHT: 58 IN | OXYGEN SATURATION: 96 % | DIASTOLIC BLOOD PRESSURE: 63 MMHG | SYSTOLIC BLOOD PRESSURE: 137 MMHG | WEIGHT: 84 LBS | HEART RATE: 70 BPM | BODY MASS INDEX: 17.63 KG/M2 | RESPIRATION RATE: 16 BRPM | TEMPERATURE: 98.8 F

## 2018-07-26 DIAGNOSIS — R10.13 EPIGASTRIC PAIN: Primary | ICD-10-CM

## 2018-07-26 DIAGNOSIS — R10.13 EPIGASTRIC PAIN: ICD-10-CM

## 2018-07-26 NOTE — PROGRESS NOTES
HISTORY OF PRESENT ILLNESS  Mk Munson is a 80 y.o. female. HPI  abd pain, not improving, she is still having abdominal pain, mainly above the umbilicus, no N/V, she is on prilosec daily, her recent US of the abdomin noted and showed no acute abnormality, her Amylase and Lipase were normal  Her constipation improved on miralax  Review of Systems   Constitutional: Negative for fever. Gastrointestinal: Negative for blood in stool, melena, nausea and vomiting. Physical Exam   Cardiovascular: Normal rate, regular rhythm and normal heart sounds. Pulmonary/Chest: Effort normal and breath sounds normal.   Abdominal: Soft. Bowel sounds are normal. There is no hepatosplenomegaly. There is tenderness in the epigastric area. There is no rigidity, no rebound and no guarding. Vitals reviewed. ASSESSMENT and PLAN  Diagnoses and all orders for this visit:    1. Epigastric pain, uncontrolled  -     REFERRAL TO GASTROENTEROLOGY  -     H PYLORI AG, STOOL; Future  -     OCCULT BLOOD, IMMUNOASSAY (FIT); Future  - continue prilosec daily    Lab Results   Component Value Date/Time    WBC 5.7 07/19/2018 09:39 AM    WBC 5.1 06/18/2012 09:28 AM    HGB 9.6 (L) 07/19/2018 09:39 AM    HCT 29.9 (L) 07/19/2018 09:39 AM    PLATELET 640 39/85/3136 09:39 AM    MCV 63.2 (L) 07/19/2018 09:39 AM     Lab Results   Component Value Date/Time    Sodium 139 07/19/2018 09:39 AM    Potassium 3.1 (L) 07/19/2018 09:39 AM    Chloride 103 07/19/2018 09:39 AM    CO2 31 07/19/2018 09:39 AM    Anion gap 5 07/19/2018 09:39 AM    Glucose 108 (H) 07/19/2018 09:39 AM    BUN 22 (H) 07/19/2018 09:39 AM    Creatinine 1.09 07/19/2018 09:39 AM    BUN/Creatinine ratio 20 07/19/2018 09:39 AM    GFR est AA 57 (L) 07/19/2018 09:39 AM    GFR est non-AA 47 (L) 07/19/2018 09:39 AM    Calcium 8.3 (L) 07/19/2018 09:39 AM    Bilirubin, total 1.7 (H) 07/19/2018 09:39 AM    AST (SGOT) 17 07/19/2018 09:39 AM    Alk.  phosphatase 48 07/19/2018 09:39 AM    Protein, total 6.5 07/19/2018 09:39 AM    Albumin 3.2 (L) 07/19/2018 09:39 AM    Globulin 3.3 07/19/2018 09:39 AM    A-G Ratio 1.0 07/19/2018 09:39 AM    ALT (SGPT) 16 07/19/2018 09:39 AM

## 2018-07-26 NOTE — PROGRESS NOTES
Shankar Jaime is a 80 y.o. female (: 1930) presenting to address:    Chief Complaint   Patient presents with    Abdominal Pain     follow up     pain scale 0/10       Vitals:    18 1032   BP: 137/63   Pulse: 70   Resp: 16   Temp: 98.8 °F (37.1 °C)   TempSrc: Oral   SpO2: 96%   Weight: 84 lb (38.1 kg)   Height: 4' 10\" (1.473 m)   PainSc:   0 - No pain       Hearing/Vision:   No exam data present    Learning Assessment:     Learning Assessment 2015   PRIMARY LEARNER Patient   HIGHEST LEVEL OF EDUCATION - PRIMARY LEARNER  DID NOT GRADUATE HIGH SCHOOL   BARRIERS PRIMARY LEARNER READING   CO-LEARNER CAREGIVER Yes   CO-LEARNER NAME Daughter- [address] LEVEL OF EDUCATION 4 YEARS OF [address] (MANDARIN)   PRIMARY LANGUAGE CO-LEARNER CHINESE (MANDARIN)    NEED No   LEARNER PREFERENCE PRIMARY PICTURES   LEARNER PREFERENCE CO-LEARNER READING   LEARNING SPECIAL TOPICS none   ANSWERED BY patient   RELATIONSHIP SELF   ASSESSMENT COMMENT n/a     Depression Screening:     PHQ over the last two weeks 2018   Little interest or pleasure in doing things Not at all   Feeling down, depressed, irritable, or hopeless Not at all   Total Score PHQ 2 0     Fall Risk Assessment:     Fall Risk Assessment, last 12 mths 2018   Able to walk? Yes   Fall in past 12 months? No     Abuse Screening:     Abuse Screening Questionnaire 11/15/2017   Do you ever feel afraid of your partner? N   Are you in a relationship with someone who physically or mentally threatens you? N   Is it safe for you to go home? Y     Coordination of Care Questionaire:   1. Have you been to the ER, urgent care clinic since your last visit? Hospitalized since your last visit? NO    2. Have you seen or consulted any other health care providers outside of the [address] [place] [address] since your last visit? Include any pap smears or colon screening.  NO    Advanced Directive:   1. Do you have an Advanced Directive? NO    2. Would you like information on Advanced Directives?  NO

## 2018-07-26 NOTE — MR AVS SNAPSHOT
[address] [facility] 
 
 
 [address] [facility] [id number] [phone] Patient: Zay Campbell MRN: [id number] ETT:[id number] Visit Information Date & Time Provider Department Dept. Phone Encounter #  
 7/26/2018 10:30 AM Jeannette Alberts, 3 [facility] [phone] [id number] Your Appointments 10/22/2018  2:30 PM  
Follow Up with Jeannette Alberts MD  
3 [address]) Appt Note: 4 month f/u; fdgd [address] [facility] [id number] [phone]  
  
   
 [address] Upcoming Health Maintenance Date Due DTaP/Tdap/Td series (1 - Tdap) 6/22/1951 Bone Densitometry (Dexa) Screening 6/22/1995 Pneumococcal 65+ Low/Medium Risk (2 of 2 - PCV13) 5/30/2015 MEDICARE YEARLY EXAM 3/14/2018 Influenza Age 5 to Adult 8/1/2018 GLAUCOMA SCREENING Q2Y 6/6/2020 Allergies as of 7/26/2018  Review Complete On: 7/26/2018 By: Minerva Hernández No Known Allergies Current Immunizations  Reviewed on 11/11/2017 Name Date Influenza High Dose Vaccine PF 10/3/2017, 10/31/2016, 10/7/2015 Influenza Vaccine 9/22/2014, 10/9/2009 12:00 AM  
 Pneumococcal Polysaccharide (PPSV-23) 5/30/2014 Not reviewed this visit You Were Diagnosed With   
  
 Codes Comments Epigastric pain    -  Primary ICD-10-CM: R10.13 ICD-9-CM: 789.06 Vitals BP Pulse Temp Resp Height(growth percentile) Weight(growth percentile)  
 137/63 (BP 1 Location: Right arm, BP Patient Position: Sitting) 70 98.8 °F (37.1 °C) (Oral) 16 4' 10\" (1.473 m) 84 lb (38.1 kg) LMP SpO2 BMI OB Status Smoking Status (LMP Unknown) 96% 17.56 kg/m2 Postmenopausal Never Smoker Vitals History BMI and BSA Data Body Mass Index Body Surface Area  
 17.56 kg/m 2 1.25 m 2 Preferred Pharmacy Pharmacy Name Phone [phone] [place] [address] [phone] Your Updated Medication List  
  
   
This list is accurate as of 7/26/18 10:56 AM.  Always use your most recent med list.  
  
  
  
  
 albuterol 90 mcg/actuation inhaler Commonly known as:  PROAIR HFA  
inhale 2 puffs every 4 hours if needed for wheezing  
  
 amLODIPine 10 mg tablet Commonly known as:  NORVASC  
take 1 tablet by mouth once daily Cholecalciferol (Vitamin D3) 2,000 unit Cap capsule Commonly known as:  VITAMIN D3 Take  by mouth daily. COUMADIN 1 mg tablet Generic drug:  warfarin Take 2 mg by mouth daily. fluticasone-salmeterol 115-21 mcg/actuation inhaler Commonly known as:  ADVAIR HFA Take 2 Puffs by inhalation two (2) times a day. levothyroxine 75 mcg tablet Commonly known as:  SYNTHROID Take 1 Tab by mouth Daily (before breakfast). omeprazole 40 mg capsule Commonly known as:  PRILOSEC Take 1 Cap by mouth daily. polyethylene glycol 17 gram/dose powder Commonly known as:  Alonza Schimke Take 17 g by mouth daily. potassium chloride 20 mEq tablet Commonly known as:  KLOR-CON M20 Take 1 Tab by mouth daily. valsartan-hydroCHLOROthiazide 160-12.5 mg per tablet Commonly known as:  DIOVAN HCT Take 1 Tab by mouth daily. We Performed the Following REFERRAL TO GASTROENTEROLOGY [SYO21 Custom] Comments:  
 Epigastric pain, not better on prilosec, US was normal  
  
To-Do List   
 07/26/2018 Lab:  Dewart, STOOL   
  
 07/26/2018 Lab:  OCCULT BLOOD, IMMUNOASSAY (FIT) Referral Information Referral ID Referred By Referred To  
  
 [id number] MD Ron Leonardo [address] [place], [address] [place] [address] Phone: [phone] Fax: [phone] Visits Status Start Date End Date 1 New Request 7/26/18 7/26/19 If your referral has a status of pending review or denied, additional information will be sent to support the outcome of this decision. Introducing [facility]! Dear Vikram Howard: Thank you for requesting a [employer] account. Our records indicate that you already have an active [employer] account. You can access your account anytime at [email or web address] Did you know that you can access your hospital and ER discharge instructions at any time in [employer]? You can also review all of your test results from your hospital stay or ER visit. Additional Information If you have questions, please visit the Frequently Asked Questions section of the [employer] website at [email or web address] Remember, [employer] is NOT to be used for urgent needs. For medical emergencies, dial 911. Now available from your iPhone and Android! Please provide this summary of care documentation to your next provider. Your primary care clinician is listed as [facility]. If you have any questions after today's visit, please call [phone].

## 2018-08-06 LAB
H. PYLORI STOOL AG,EIA, 180764: NEGATIVE
RESULT: NORMAL

## 2018-09-05 ENCOUNTER — TELEPHONE (OUTPATIENT)
Dept: FAMILY MEDICINE CLINIC | Age: 83
End: 2018-09-05

## 2018-09-05 DIAGNOSIS — Z78.0 POSTMENOPAUSAL: ICD-10-CM

## 2018-09-05 DIAGNOSIS — Z13.820 SCREENING FOR OSTEOPOROSIS: Primary | ICD-10-CM

## 2018-09-05 NOTE — TELEPHONE ENCOUNTER
Pt has decided to go ahead with the bone density scan discussed with Dr. Julio C Kellogg at previous appt. Wants to know what next steps are. Please call.

## 2018-09-05 NOTE — TELEPHONE ENCOUNTER
DEXA scan ordered, please fax to [facility], and call pt, she should get a call from central scheduling

## 2018-10-22 ENCOUNTER — OFFICE VISIT (OUTPATIENT)
Dept: FAMILY MEDICINE CLINIC | Age: 83
End: 2018-10-22

## 2018-10-22 VITALS
TEMPERATURE: 97.5 F | DIASTOLIC BLOOD PRESSURE: 61 MMHG | BODY MASS INDEX: 18.05 KG/M2 | HEIGHT: 58 IN | SYSTOLIC BLOOD PRESSURE: 134 MMHG | RESPIRATION RATE: 16 BRPM | HEART RATE: 71 BPM | WEIGHT: 86 LBS | OXYGEN SATURATION: 96 %

## 2018-10-22 DIAGNOSIS — I10 ESSENTIAL HYPERTENSION: Primary | ICD-10-CM

## 2018-10-22 DIAGNOSIS — E87.6 HYPOKALEMIA: ICD-10-CM

## 2018-10-22 DIAGNOSIS — Z23 ENCOUNTER FOR IMMUNIZATION: ICD-10-CM

## 2018-10-22 DIAGNOSIS — E03.9 ACQUIRED HYPOTHYROIDISM: ICD-10-CM

## 2018-10-22 DIAGNOSIS — J45.909 PERSISTENT ASTHMA WITHOUT COMPLICATION, UNSPECIFIED ASTHMA SEVERITY: ICD-10-CM

## 2018-10-22 NOTE — PROGRESS NOTES
Tunde Julian is a 80 y.o. female (: 1930) presenting to address: Chief Complaint Patient presents with  Medication Evaluation Vitals:  
 10/22/18 1437 BP: 134/61 Pulse: 71 Resp: 16 Temp: 97.5 °F (36.4 °C) TempSrc: Oral  
SpO2: 96% Weight: 86 lb (39 kg) Height: 4' 10\" (1.473 m) PainSc:   0 - No pain Hearing/Vision: No exam data present Learning Assessment:  
 
Learning Assessment 2015 PRIMARY LEARNER Patient HIGHEST LEVEL OF EDUCATION - PRIMARY LEARNER  DID NOT GRADUATE HIGH SCHOOL  
BARRIERS PRIMARY LEARNER READING  
CO-LEARNER CAREGIVER Yes [address] NAME Daughter- Ann Watts CO-LEARNER HIGHEST LEVEL OF EDUCATION 4 YEARS OF COLLEGE  
BARRIERS CO-LEARNER NONE PRIMARY LANGUAGE CHINESE (MANDARIN) PRIMARY LANGUAGE CO-LEARNER CHINESE (MANDARIN)  NEED No  
LEARNER PREFERENCE PRIMARY PICTURES  
LEARNER PREFERENCE CO-LEARNER READING  
LEARNING SPECIAL TOPICS none ANSWERED BY patient RELATIONSHIP SELF  
ASSESSMENT COMMENT n/a Depression Screening: PHQ over the last two weeks 10/22/2018 Little interest or pleasure in doing things Not at all Feeling down, depressed, irritable, or hopeless Not at all Total Score PHQ 2 0 Fall Risk Assessment:  
 
Fall Risk Assessment, last 12 mths 10/22/2018 Able to walk? Yes Fall in past 12 months? No  
 
Abuse Screening:  
 
Abuse Screening Questionnaire 11/15/2017 Do you ever feel afraid of your partner? Judith Cespedes Are you in a relationship with someone who physically or mentally threatens you? Judith Cespedes Is it safe for you to go home? Leyda Martinez Coordination of Care Questionaire: 1. Have you been to the ER, urgent care clinic since your last visit? Hospitalized since your last visit? NO 
 
2. Have you seen or consulted any other health care providers outside of the [address] [place] [address] since your last visit? Include any pap smears or colon screening. NO Advanced Directive: 1. Do you have an Advanced Directive? YES 
 
2. Would you like information on Advanced Directives?  NO

## 2018-10-22 NOTE — PROGRESS NOTES
HISTORY OF PRESENT ILLNESS Kalyani Crum is a 80 y.o. female. HPI 
HTN, stable, bp is well controlled on meds daily Hypothyroid, stable on synthroid daily Asthma, stable on advair, no wheezing or sob Hypokalemia, improving on k dur daily Review of Systems Constitutional: Negative for fever. Respiratory: Negative for cough, shortness of breath and wheezing. Cardiovascular: Negative for chest pain, palpitations and leg swelling. Gastrointestinal: Negative for abdominal pain and nausea. Genitourinary: Negative for dysuria. Musculoskeletal: Negative for myalgias. Neurological: Negative for headaches. Physical Exam  
Constitutional: She appears well-developed and well-nourished. No distress. Neck: No thyromegaly present. Cardiovascular: Normal rate and normal heart sounds. An irregularly irregular rhythm present. No murmur heard. Pulmonary/Chest: Effort normal and breath sounds normal. She has no wheezes. She has no rales. Abdominal: Soft. Bowel sounds are normal. There is no tenderness. Musculoskeletal: She exhibits no edema. Neurological: She is alert. Vitals reviewed. ASSESSMENT and PLAN Diagnoses and all orders for this visit: 1. Essential hypertension, stable 
-     CBC W/O DIFF; Future -     METABOLIC PANEL, BASIC; Future 2. Acquired hypothyroidism, stable 
-     TSH 3RD GENERATION; Future -     T4, FREE; Future 3. Persistent asthma without complication, unspecified asthma severity, stable 4. Hypokalemia, improving -     METABOLIC PANEL, BASIC; Future 5. Encounter for immunization -     pneumococcal 13 mona conj dip (PREVNAR-13) 0.5 mL syrg injection; 0.5 mL by IntraMUSCular route once for 1 dose. Lab Results Component Value Date/Time  Sodium 139 07/19/2018 09:39 AM  
 Potassium 3.1 (L) 07/19/2018 09:39 AM  
 Chloride 103 07/19/2018 09:39 AM  
 CO2 31 07/19/2018 09:39 AM  
 Anion gap 5 07/19/2018 09:39 AM  
 Glucose 108 (H) 07/19/2018 09:39 AM  
 BUN 22 (H) 07/19/2018 09:39 AM  
 Creatinine 1.09 07/19/2018 09:39 AM  
 BUN/Creatinine ratio 20 07/19/2018 09:39 AM  
 GFR est AA 57 (L) 07/19/2018 09:39 AM  
 GFR est non-AA 47 (L) 07/19/2018 09:39 AM  
 Calcium 8.3 (L) 07/19/2018 09:39 AM  
 Bilirubin, total 1.7 (H) 07/19/2018 09:39 AM  
 AST (SGOT) 17 07/19/2018 09:39 AM  
 Alk. phosphatase 48 07/19/2018 09:39 AM  
 Protein, total 6.5 07/19/2018 09:39 AM  
 Albumin 3.2 (L) 07/19/2018 09:39 AM  
 Globulin 3.3 07/19/2018 09:39 AM  
 A-G Ratio 1.0 07/19/2018 09:39 AM  
 ALT (SGPT) 16 07/19/2018 09:39 AM  
 
 
Lab Results Component Value Date/Time TSH 0.60 06/08/2018 02:23 PM  
rtc 4 mos Continue current meds, pt has refills

## 2018-11-26 ENCOUNTER — TELEPHONE (OUTPATIENT)
Dept: FAMILY MEDICINE CLINIC | Age: 83
End: 2018-11-26

## 2018-11-26 NOTE — TELEPHONE ENCOUNTER
Patient's daughter states that her mother has gotten too weak to use her walker and needs to have an order for a wheelchair. She states that she needs the order to state that it is a small wheelchair. I told her that I would forward Dr. Chiqui Riley the request and instructed her to call back with the fax number of [address]. She then called back and stated that [facility] would pay for her diapers if Dr. Chiqui Riley wrote an order for it and faxed it to [facility]. I informed her that I did not see a diagnosis code for urinary incontinence and that Dr Chiqui Riley may need to see her before he can do an order for diapers. Please Advise.

## 2018-11-27 NOTE — TELEPHONE ENCOUNTER
Left message for Patient to return call. She needs to schedule appointment to have forms filled out.

## 2018-11-28 ENCOUNTER — OFFICE VISIT (OUTPATIENT)
Dept: FAMILY MEDICINE CLINIC | Age: 83
End: 2018-11-28

## 2018-11-28 ENCOUNTER — HOSPITAL ENCOUNTER (OUTPATIENT)
Dept: LAB | Age: 83
Discharge: HOME | End: 2018-11-28
Payer: MEDICARE

## 2018-11-28 VITALS
OXYGEN SATURATION: 97 % | HEART RATE: 71 BPM | SYSTOLIC BLOOD PRESSURE: 148 MMHG | DIASTOLIC BLOOD PRESSURE: 64 MMHG | BODY MASS INDEX: 18.56 KG/M2 | TEMPERATURE: 98.4 F | RESPIRATION RATE: 20 BRPM | WEIGHT: 88.4 LBS | HEIGHT: 58 IN

## 2018-11-28 DIAGNOSIS — E03.9 ACQUIRED HYPOTHYROIDISM: ICD-10-CM

## 2018-11-28 DIAGNOSIS — E87.6 HYPOKALEMIA: ICD-10-CM

## 2018-11-28 DIAGNOSIS — R26.9 ABNORMALITY OF GAIT AND MOBILITY: ICD-10-CM

## 2018-11-28 DIAGNOSIS — I10 ESSENTIAL HYPERTENSION: ICD-10-CM

## 2018-11-28 DIAGNOSIS — R39.81 FUNCTIONAL URINARY INCONTINENCE: Primary | ICD-10-CM

## 2018-11-28 LAB
ANION GAP SERPL CALC-SCNC: 7 MMOL/L (ref 3–18)
BILIRUB UR QL STRIP: NEGATIVE
BUN SERPL-MCNC: 23 MG/DL (ref 7–18)
BUN/CREAT SERPL: 18 (ref 12–20)
CALCIUM SERPL-MCNC: 8.9 MG/DL (ref 8.5–10.1)
CHLORIDE SERPL-SCNC: 106 MMOL/L (ref 100–108)
CO2 SERPL-SCNC: 30 MMOL/L (ref 21–32)
CREAT SERPL-MCNC: 1.27 MG/DL (ref 0.6–1.3)
ERYTHROCYTE [DISTWIDTH] IN BLOOD BY AUTOMATED COUNT: 16.8 % (ref 11.6–14.5)
GLUCOSE SERPL-MCNC: 102 MG/DL (ref 74–99)
GLUCOSE UR-MCNC: NEGATIVE MG/DL
HCT VFR BLD AUTO: 31.1 % (ref 35–45)
HGB BLD-MCNC: 9.8 G/DL (ref 12–16)
KETONES P FAST UR STRIP-MCNC: NEGATIVE MG/DL
MCH RBC QN AUTO: 20.7 PG (ref 24–34)
MCHC RBC AUTO-ENTMCNC: 31.5 G/DL (ref 31–37)
MCV RBC AUTO: 65.6 FL (ref 74–97)
PH UR STRIP: 5.5 [PH] (ref 4.6–8)
PLATELET # BLD AUTO: 190 K/UL (ref 135–420)
POTASSIUM SERPL-SCNC: 4.3 MMOL/L (ref 3.5–5.5)
PROT UR QL STRIP: NORMAL
RBC # BLD AUTO: 4.74 M/UL (ref 4.2–5.3)
SODIUM SERPL-SCNC: 143 MMOL/L (ref 136–145)
SP GR UR STRIP: 1.02 (ref 1–1.03)
T4 FREE SERPL-MCNC: 1.5 NG/DL (ref 0.7–1.5)
TSH SERPL DL<=0.05 MIU/L-ACNC: 1.6 UIU/ML (ref 0.36–3.74)
UA UROBILINOGEN AMB POC: NORMAL (ref 0.2–1)
URINALYSIS CLARITY POC: NORMAL
URINALYSIS COLOR POC: YELLOW
URINE BLOOD POC: NORMAL
URINE LEUKOCYTES POC: NEGATIVE
URINE NITRITES POC: NEGATIVE
WBC # BLD AUTO: 4.5 K/UL (ref 4.6–13.2)

## 2018-11-28 PROCEDURE — 84443 ASSAY THYROID STIM HORMONE: CPT

## 2018-11-28 PROCEDURE — 85027 COMPLETE CBC AUTOMATED: CPT

## 2018-11-28 PROCEDURE — 80048 BASIC METABOLIC PNL TOTAL CA: CPT

## 2018-11-28 PROCEDURE — 84439 ASSAY OF FREE THYROXINE: CPT

## 2018-11-28 PROCEDURE — 36415 COLL VENOUS BLD VENIPUNCTURE: CPT

## 2018-11-28 PROCEDURE — 87086 URINE CULTURE/COLONY COUNT: CPT

## 2018-11-28 NOTE — PROGRESS NOTES
Kimmy Franco is a 80 y.o. female (: 1930) presenting to address: Chief Complaint Patient presents with  Enuresis Vitals:  
 18 1446 BP: 148/64 Pulse: 71 Resp: 20 Temp: 98.4 °F (36.9 °C) TempSrc: Oral  
SpO2: 97% Weight: 88 lb 6.4 oz (40.1 kg) Height: 4' 10\" (1.473 m) PainSc:   0 - No pain Hearing/Vision: No exam data present Learning Assessment:  
 
Learning Assessment 2015 PRIMARY LEARNER Patient HIGHEST LEVEL OF EDUCATION - PRIMARY LEARNER  DID NOT GRADUATE HIGH SCHOOL  
BARRIERS PRIMARY LEARNER READING  
CO-LEARNER CAREGIVER Yes [address] NAME Daughter- Mary Prince CO-LEARNER HIGHEST LEVEL OF EDUCATION 4 YEARS OF COLLEGE  
BARRIERS CO-LEARNER NONE PRIMARY LANGUAGE CHINESE (MANDARIN) PRIMARY LANGUAGE CO-LEARNER CHINESE (MANDARIN)  NEED No  
LEARNER PREFERENCE PRIMARY PICTURES  
LEARNER PREFERENCE CO-LEARNER READING  
LEARNING SPECIAL TOPICS none ANSWERED BY patient RELATIONSHIP SELF  
ASSESSMENT COMMENT n/a Depression Screening: PHQ over the last two weeks 2018 PHQ Not Done Patient Decline Little interest or pleasure in doing things - Feeling down, depressed, irritable, or hopeless - Total Score PHQ 2 - Fall Risk Assessment:  
 
Fall Risk Assessment, last 12 mths 2018 Able to walk? Yes Fall in past 12 months? No  
 
Abuse Screening:  
 
Abuse Screening Questionnaire 11/15/2017 Do you ever feel afraid of your partner? Maude Mandril Are you in a relationship with someone who physically or mentally threatens you? Maude Mandril Is it safe for you to go home? Lisha Cohen Coordination of Care Questionaire: 1. Have you been to the ER, urgent care clinic since your last visit? Hospitalized since your last visit? NO 
 
2. Have you seen or consulted any other health care providers outside of the [address] [place] [address] since your last visit? Include any pap smears or colon screening. NO Advanced Directive: 1. Do you have an Advanced Directive? YES 
 
2. Would you like information on Advanced Directives?  NO

## 2018-11-28 NOTE — PROGRESS NOTES
HISTORY OF PRESENT ILLNESS Tunde Julian is a 80 y.o. female. HPI Gait abnormality, pt use walker in the house, but not able to use it outside any more , her daughter stated that pt is not able to use her walker when she goes outside the house anymore due to worsening weaknes, she also has Asthma and Atrial fibrillation, she does need a small size wheelchair for mobility. Urinary incontinence, pt's daughter stated that pt has been having wetting accidents during the day and at night, she is not able to make it to the bathroom due to her slow/abnormal gait, no dysuria, she has chronic hematuria and was evaluated by urology before Review of Systems Constitutional: Negative for fever. Gastrointestinal: Negative for abdominal pain. Genitourinary: Negative for dysuria, flank pain and frequency. Physical Exam  
Constitutional:  
Elderly frail female Cardiovascular: Normal rate, regular rhythm and normal heart sounds. Pulmonary/Chest: Effort normal and breath sounds normal. She has no wheezes. She has no rales. Neurological: She is alert. Gait (slow/abnormal gait) abnormal.  
Vitals reviewed. ASSESSMENT and PLAN Diagnoses and all orders for this visit: 1. Functional urinary incontinence -     AMB POC URINALYSIS DIP STICK AUTO W/O MICRO 
-     CULTURE, URINE; Future -     AMB SUPPLY ORDER 
 
2. Abnormality of gait and mobility -     AMB SUPPLY ORDER Results for orders placed or performed in visit on 11/28/18 AMB POC URINALYSIS DIP STICK AUTO W/O MICRO Result Value Ref Range Color (UA POC) Yellow Clarity (UA POC) Turbid Glucose (UA POC) Negative Negative Bilirubin (UA POC) Negative Negative Ketones (UA POC) Negative Negative Specific gravity (UA POC) 1.025 1.001 - 1.035 Blood (UA POC) 2+ Negative pH (UA POC) 5.5 4.6 - 8.0 Protein (UA POC) Trace Negative Urobilinogen (UA POC) 1 mg/dL 0.2 - 1 Nitrites (UA POC) Negative Negative Leukocyte esterase (UA POC) Negative Negative

## 2018-11-29 ENCOUNTER — TELEPHONE (OUTPATIENT)
Dept: FAMILY MEDICINE CLINIC | Age: 83
End: 2018-11-29

## 2018-11-29 LAB
BACTERIA SPEC CULT: ABNORMAL
BACTERIA SPEC CULT: ABNORMAL
SERVICE CMNT-IMP: ABNORMAL

## 2018-11-29 NOTE — TELEPHONE ENCOUNTER
Jessica Farmer called from [address] [place] [address] to state that she confirmed with insurance that the PT can receive the requested under pants. They are retrieving all the needed paperwork and will fax it over to have Dr. Safia Dexter sign it when she has it all. She said to contact her if you have any questions. [phone] ext [phone]

## 2018-12-13 NOTE — TELEPHONE ENCOUNTER
Shayy Walls has called again. She talked to the PT's daughter and they decided to hold off on receiving the requested supplies until they really need them.

## 2018-12-14 DIAGNOSIS — E03.9 ACQUIRED HYPOTHYROIDISM: ICD-10-CM

## 2018-12-14 DIAGNOSIS — R10.84 GENERALIZED ABDOMINAL PAIN: ICD-10-CM

## 2018-12-14 DIAGNOSIS — I10 ESSENTIAL HYPERTENSION: ICD-10-CM

## 2018-12-14 RX ORDER — AMLODIPINE BESYLATE 10 MG/1
TABLET ORAL
Qty: 90 TAB | Refills: 1 | Status: SHIPPED | OUTPATIENT
Start: 2018-12-14 | End: 2019-06-12 | Stop reason: SDUPTHER

## 2018-12-14 RX ORDER — LEVOTHYROXINE SODIUM 75 UG/1
TABLET ORAL
Qty: 90 TAB | Refills: 1 | Status: SHIPPED | OUTPATIENT
Start: 2018-12-14 | End: 2019-06-12 | Stop reason: SDUPTHER

## 2018-12-14 RX ORDER — VALSARTAN AND HYDROCHLOROTHIAZIDE 160; 12.5 MG/1; MG/1
TABLET, FILM COATED ORAL
Qty: 90 TAB | Refills: 1 | Status: SHIPPED | OUTPATIENT
Start: 2018-12-14 | End: 2019-06-12 | Stop reason: SDUPTHER

## 2018-12-14 RX ORDER — OMEPRAZOLE 40 MG/1
CAPSULE, DELAYED RELEASE ORAL
Qty: 30 CAP | Refills: 1 | Status: SHIPPED | OUTPATIENT
Start: 2018-12-14

## 2019-02-22 ENCOUNTER — OFFICE VISIT (OUTPATIENT)
Dept: FAMILY MEDICINE CLINIC | Age: 84
End: 2019-02-22

## 2019-02-22 VITALS
HEIGHT: 58 IN | HEART RATE: 71 BPM | TEMPERATURE: 96.9 F | BODY MASS INDEX: 18.64 KG/M2 | WEIGHT: 88.8 LBS | DIASTOLIC BLOOD PRESSURE: 54 MMHG | OXYGEN SATURATION: 98 % | RESPIRATION RATE: 16 BRPM | SYSTOLIC BLOOD PRESSURE: 112 MMHG

## 2019-02-22 DIAGNOSIS — I48.20 CHRONIC ATRIAL FIBRILLATION (HCC): ICD-10-CM

## 2019-02-22 DIAGNOSIS — I10 ESSENTIAL HYPERTENSION: Primary | ICD-10-CM

## 2019-02-22 DIAGNOSIS — E03.9 ACQUIRED HYPOTHYROIDISM: ICD-10-CM

## 2019-02-22 DIAGNOSIS — D64.9 ANEMIA, UNSPECIFIED TYPE: ICD-10-CM

## 2019-02-22 DIAGNOSIS — J45.909 PERSISTENT ASTHMA WITHOUT COMPLICATION, UNSPECIFIED ASTHMA SEVERITY: ICD-10-CM

## 2019-02-22 NOTE — PROGRESS NOTES
Aaliyah Flores is a 80 y.o. female (: 1930) presenting to address: Chief Complaint Patient presents with  Follow-up Vitals:  
 19 1441 BP: 112/54 Pulse: 71 Resp: 16 Temp: 96.9 °F (36.1 °C) TempSrc: Oral  
SpO2: 98% Weight: 88 lb 12.8 oz (40.3 kg) Height: 4' 10\" (1.473 m) PainSc:   0 - No pain Hearing/Vision: No exam data present Learning Assessment:  
 
Learning Assessment 2015 PRIMARY LEARNER Patient HIGHEST LEVEL OF EDUCATION - PRIMARY LEARNER  DID NOT GRADUATE HIGH SCHOOL  
BARRIERS PRIMARY LEARNER READING  
CO-LEARNER CAREGIVER Yes [address] NAME Daughter- Gordy Mars CO-LEARNER HIGHEST LEVEL OF EDUCATION 4 YEARS OF COLLEGE  
BARRIERS CO-LEARNER NONE PRIMARY LANGUAGE CHINESE (MANDARIN) PRIMARY LANGUAGE CO-LEARNER CHINESE (MANDARIN)  NEED No  
LEARNER PREFERENCE PRIMARY PICTURES  
LEARNER PREFERENCE CO-LEARNER READING  
LEARNING SPECIAL TOPICS none ANSWERED BY patient RELATIONSHIP SELF  
ASSESSMENT COMMENT n/a Depression Screening:  
 
3 most recent PHQ Screens 2018 PHQ Not Done Patient Decline Little interest or pleasure in doing things - Feeling down, depressed, irritable, or hopeless - Total Score PHQ 2 - Fall Risk Assessment:  
 
Fall Risk Assessment, last 12 mths 2019 Able to walk? Yes Fall in past 12 months? No  
 
Abuse Screening:  
 
Abuse Screening Questionnaire 11/15/2017 Do you ever feel afraid of your partner? Elmer Joseph Are you in a relationship with someone who physically or mentally threatens you? Elmer Joseph Is it safe for you to go home? Bess Lutz Coordination of Care Questionaire: 1. Have you been to the ER, urgent care clinic since your last visit? Hospitalized since your last visit? NO 
 
2. Have you seen or consulted any other health care providers outside of the [address] [place] [address] since your last visit? Include any pap smears or colon screening. YES Coumadin Clinic  19 Advanced Directive: 1. Do you have an Advanced Directive? NO 
 
2. Would you like information on Advanced Directives?  NO

## 2019-02-22 NOTE — PROGRESS NOTES
HISTORY OF PRESENT ILLNESS Nestor Tariq is a 80 y.o. female. HPI 
HTN, stable, bp is well controled on meds daily Hypothyroid, stable on synthroid daily, last tsh was normal 
Asthma, stable on ADVAIR, use albuterol only occasionally, no sob, no wheezing Chronic anemia, stable, last hgb was 9.8, Hgb has been around 10 for years A fib, followed by cardiology and coumadin clinic Her daughter is the caregiver and she reports no complaints, pt is pleasant/smiling/looks comfortable Review of Systems Respiratory: Negative for shortness of breath and wheezing. Cardiovascular: Negative for chest pain, palpitations and leg swelling. Gastrointestinal: Negative for abdominal pain and nausea. Genitourinary: Negative for dysuria. Musculoskeletal: Negative for myalgias. Neurological: Negative for headaches. Physical Exam  
Constitutional: She appears well-developed and well-nourished. Neck: No thyromegaly present. Cardiovascular: Normal rate and normal heart sounds. An irregularly irregular rhythm present. No murmur heard. Pulmonary/Chest: Effort normal and breath sounds normal. She has no wheezes. She has no rales. Abdominal: Soft. Bowel sounds are normal. There is no tenderness. Musculoskeletal: She exhibits no edema. Neurological: She is alert. Vitals reviewed. ASSESSMENT and PLAN Diagnoses and all orders for this visit: 1. Essential hypertension, stable 
-     CBC WITH AUTOMATED DIFF; Future -     METABOLIC PANEL, COMPREHENSIVE; Future 2. Acquired hypothyroidism, stable 
-     TSH 3RD GENERATION; Future -     T4, FREE; Future 3. Persistent asthma without complication, unspecified asthma severity, controlled 
-     fluticasone-salmeterol (ADVAIR HFA) 115-21 mcg/actuation inhaler; Take 2 Puffs by inhalation two (2) times a day. 4. Chronic atrial fibrillation ([address] 5. Anemia, unspecified type, stable 
-     CBC WITH AUTOMATED DIFF; Future -     HEMOGLOBIN FRACTIONATION; Future 
-     IRON; Future -     FERRITIN; Future Lab Results Component Value Date/Time WBC 4.5 (L) 11/28/2018 02:16 PM  
 WBC 5.1 06/18/2012 09:28 AM  
 HGB 9.8 (L) 11/28/2018 02:16 PM  
 HCT 31.1 (L) 11/28/2018 02:16 PM  
 PLATELET 377 59/51/4251 02:16 PM  
 MCV 65.6 (L) 11/28/2018 02:16 PM  
 
Lab Results Component Value Date/Time  TSH 1.60 11/28/2018 02:16 PM

## 2019-04-29 DIAGNOSIS — E87.6 HYPOKALEMIA: ICD-10-CM

## 2019-04-30 RX ORDER — POTASSIUM CHLORIDE 20 MEQ/1
TABLET, EXTENDED RELEASE ORAL
Qty: 90 TAB | Refills: 1 | Status: SHIPPED | OUTPATIENT
Start: 2019-04-30 | End: 2019-10-25 | Stop reason: SDUPTHER

## 2019-06-12 DIAGNOSIS — I10 ESSENTIAL HYPERTENSION: ICD-10-CM

## 2019-06-12 DIAGNOSIS — E03.9 ACQUIRED HYPOTHYROIDISM: ICD-10-CM

## 2019-06-12 RX ORDER — LEVOTHYROXINE SODIUM 75 UG/1
TABLET ORAL
Qty: 90 TAB | Refills: 1 | Status: SHIPPED | OUTPATIENT
Start: 2019-06-12 | End: 2019-12-12 | Stop reason: SDUPTHER

## 2019-06-12 RX ORDER — VALSARTAN AND HYDROCHLOROTHIAZIDE 160; 12.5 MG/1; MG/1
TABLET, FILM COATED ORAL
Qty: 90 TAB | Refills: 1 | Status: SHIPPED | OUTPATIENT
Start: 2019-06-12 | End: 2019-12-12 | Stop reason: SDUPTHER

## 2019-06-12 RX ORDER — AMLODIPINE BESYLATE 10 MG/1
TABLET ORAL
Qty: 90 TAB | Refills: 1 | Status: SHIPPED | OUTPATIENT
Start: 2019-06-12 | End: 2019-12-12 | Stop reason: SDUPTHER

## 2019-06-17 ENCOUNTER — OFFICE VISIT (OUTPATIENT)
Dept: FAMILY MEDICINE CLINIC | Age: 84
End: 2019-06-17

## 2019-06-17 ENCOUNTER — HOSPITAL ENCOUNTER (OUTPATIENT)
Dept: LAB | Age: 84
Discharge: HOME | End: 2019-06-17
Payer: MEDICARE

## 2019-06-17 VITALS
BODY MASS INDEX: 17.63 KG/M2 | OXYGEN SATURATION: 96 % | HEART RATE: 70 BPM | RESPIRATION RATE: 16 BRPM | TEMPERATURE: 96.9 F | SYSTOLIC BLOOD PRESSURE: 122 MMHG | HEIGHT: 58 IN | WEIGHT: 84 LBS | DIASTOLIC BLOOD PRESSURE: 60 MMHG

## 2019-06-17 DIAGNOSIS — J45.909 PERSISTENT ASTHMA WITHOUT COMPLICATION, UNSPECIFIED ASTHMA SEVERITY: ICD-10-CM

## 2019-06-17 DIAGNOSIS — E03.9 ACQUIRED HYPOTHYROIDISM: ICD-10-CM

## 2019-06-17 DIAGNOSIS — D64.9 ANEMIA, UNSPECIFIED TYPE: ICD-10-CM

## 2019-06-17 DIAGNOSIS — I10 ESSENTIAL HYPERTENSION: Primary | ICD-10-CM

## 2019-06-17 DIAGNOSIS — I10 ESSENTIAL HYPERTENSION: ICD-10-CM

## 2019-06-17 LAB
ALBUMIN SERPL-MCNC: 3.5 G/DL (ref 3.4–5)
ALBUMIN/GLOB SERPL: 1.2 {RATIO} (ref 0.8–1.7)
ALP SERPL-CCNC: 63 U/L (ref 45–117)
ALT SERPL-CCNC: 19 U/L (ref 13–56)
ANION GAP SERPL CALC-SCNC: 7 MMOL/L (ref 3–18)
AST SERPL-CCNC: 21 U/L (ref 15–37)
BASOPHILS # BLD: 0 K/UL (ref 0–0.1)
BASOPHILS NFR BLD: 1 % (ref 0–2)
BILIRUB SERPL-MCNC: 1.2 MG/DL (ref 0.2–1)
BUN SERPL-MCNC: 20 MG/DL (ref 7–18)
BUN/CREAT SERPL: 19 (ref 12–20)
CALCIUM SERPL-MCNC: 8.6 MG/DL (ref 8.5–10.1)
CHLORIDE SERPL-SCNC: 104 MMOL/L (ref 100–108)
CO2 SERPL-SCNC: 30 MMOL/L (ref 21–32)
CREAT SERPL-MCNC: 1.03 MG/DL (ref 0.6–1.3)
DIFFERENTIAL METHOD BLD: ABNORMAL
EOSINOPHIL # BLD: 0.1 K/UL (ref 0–0.4)
EOSINOPHIL NFR BLD: 3 % (ref 0–5)
ERYTHROCYTE [DISTWIDTH] IN BLOOD BY AUTOMATED COUNT: 17.9 % (ref 11.6–14.5)
FERRITIN SERPL-MCNC: 153 NG/ML (ref 8–388)
GLOBULIN SER CALC-MCNC: 3 G/DL (ref 2–4)
GLUCOSE SERPL-MCNC: 79 MG/DL (ref 74–99)
HCT VFR BLD AUTO: 31.3 % (ref 35–45)
HGB BLD-MCNC: 9.6 G/DL (ref 12–16)
IRON SERPL-MCNC: 43 UG/DL (ref 50–175)
LYMPHOCYTES # BLD: 0.6 K/UL (ref 0.9–3.6)
LYMPHOCYTES NFR BLD: 17 % (ref 21–52)
MCH RBC QN AUTO: 20 PG (ref 24–34)
MCHC RBC AUTO-ENTMCNC: 30.7 G/DL (ref 31–37)
MCV RBC AUTO: 65.1 FL (ref 74–97)
MONOCYTES # BLD: 0.2 K/UL (ref 0.05–1.2)
MONOCYTES NFR BLD: 7 % (ref 3–10)
NEUTS SEG # BLD: 2.7 K/UL (ref 1.8–8)
NEUTS SEG NFR BLD: 72 % (ref 40–73)
PLATELET # BLD AUTO: 145 K/UL (ref 135–420)
POTASSIUM SERPL-SCNC: 3.4 MMOL/L (ref 3.5–5.5)
PROT SERPL-MCNC: 6.5 G/DL (ref 6.4–8.2)
RBC # BLD AUTO: 4.81 M/UL (ref 4.2–5.3)
SODIUM SERPL-SCNC: 141 MMOL/L (ref 136–145)
T4 FREE SERPL-MCNC: 1.4 NG/DL (ref 0.7–1.5)
TSH SERPL DL<=0.05 MIU/L-ACNC: 0.73 UIU/ML (ref 0.36–3.74)
WBC # BLD AUTO: 3.7 K/UL (ref 4.6–13.2)

## 2019-06-17 PROCEDURE — 85025 COMPLETE CBC W/AUTO DIFF WBC: CPT

## 2019-06-17 PROCEDURE — 83021 HEMOGLOBIN CHROMOTOGRAPHY: CPT

## 2019-06-17 PROCEDURE — 80053 COMPREHEN METABOLIC PANEL: CPT

## 2019-06-17 PROCEDURE — 83540 ASSAY OF IRON: CPT

## 2019-06-17 PROCEDURE — 36415 COLL VENOUS BLD VENIPUNCTURE: CPT

## 2019-06-17 PROCEDURE — 84439 ASSAY OF FREE THYROXINE: CPT

## 2019-06-17 PROCEDURE — 82728 ASSAY OF FERRITIN: CPT

## 2019-06-17 PROCEDURE — 84443 ASSAY THYROID STIM HORMONE: CPT

## 2019-06-17 NOTE — PROGRESS NOTES
HISTORY OF PRESENT ILLNESS  Mk Gracia is a 80 y.o. female. HPI  HTN, stable, bp is well controlled on meds daily  Hypothyroid, stable on synthroid, she did not get her labs done last visit! Asthma, well controlled, no wheezing or sob  A fib, followed by Cardiology  Review of Systems   Respiratory: Negative for shortness of breath and wheezing. Cardiovascular: Negative for chest pain, palpitations and leg swelling. Gastrointestinal: Negative for abdominal pain. Genitourinary: Negative for dysuria. Musculoskeletal: Negative for myalgias. Neurological: Negative for headaches. Physical Exam   Constitutional: She appears well-developed and well-nourished. Neck: No thyromegaly present. Cardiovascular: Normal rate and normal heart sounds. An irregularly irregular rhythm present. No murmur heard. Pulmonary/Chest: Effort normal and breath sounds normal. She has no wheezes. She has no rales. Abdominal: Soft. Bowel sounds are normal. There is no tenderness. Musculoskeletal: She exhibits no edema. Neurological: She is alert. Vitals reviewed. ASSESSMENT and PLAN  Diagnoses and all orders for this visit:    1. Essential hypertension, stable    2. Acquired hypothyroidism, stable    3. Persistent asthma without complication, unspecified asthma severity, controled  -     fluticasone propion-salmeterol (ADVAIR HFA) 115-21 mcg/actuation inhaler; Take 2 Puffs by inhalation two (2) times a day.     Continue current meds  Labs are already ordered, will do today  Discussed with pt and her 2 daughters today  rtc 4 months

## 2019-06-17 NOTE — PROGRESS NOTES
Deedee Pedraza is a 80 y.o. female (: 1930) presenting to address:    Chief Complaint   Patient presents with    Medication Evaluation     follow up       Vitals:    19 0958   BP: 122/60   Pulse: 70   Resp: 16   Temp: 96.9 °F (36.1 °C)   TempSrc: Oral   SpO2: 96%   Weight: 84 lb (38.1 kg)   Height: 4' 10\" (1.473 m)   PainSc:   2   PainLoc: Back       Hearing/Vision:   No exam data present    Learning Assessment:     Learning Assessment 2015   PRIMARY LEARNER Patient   HIGHEST LEVEL OF EDUCATION - PRIMARY LEARNER  DID NOT GRADUATE HIGH SCHOOL   BARRIERS PRIMARY LEARNER READING   CO-LEARNER CAREGIVER Yes   CO-LEARNER NAME Daughter- [address] LEVEL OF EDUCATION 4 YEARS OF COLLEGE   BARRIERS CO-LEARNER NONE   PRIMARY LANGUAGE CHINESE (MANDARIN)   PRIMARY LANGUAGE CO-LEARNER CHINESE (MANDARIN)    NEED No   LEARNER PREFERENCE PRIMARY PICTURES   LEARNER PREFERENCE CO-LEARNER READING   LEARNING SPECIAL TOPICS none   ANSWERED BY patient   RELATIONSHIP SELF   ASSESSMENT COMMENT n/a     Depression Screening:     3 most recent PHQ Screens 2019   PHQ Not Done -   Little interest or pleasure in doing things Not at all   Feeling down, depressed, irritable, or hopeless Not at all   Total Score PHQ 2 0     Fall Risk Assessment:     Fall Risk Assessment, last 12 mths 2019   Able to walk? Yes   Fall in past 12 months? Yes   Fall with injury? Yes   Number of falls in past 12 months 2   Fall Risk Score 3     Abuse Screening:     Abuse Screening Questionnaire 11/15/2017   Do you ever feel afraid of your partner? N   Are you in a relationship with someone who physically or mentally threatens you? N   Is it safe for you to go home? Y     Coordination of Care Questionaire:   1. Have you been to the ER, urgent care clinic since your last visit? Hospitalized since your last visit? NO    2.  Have you seen or consulted any other health care providers outside of the [facility] since your last visit? Include any pap smears or colon screening. NO    Advanced Directive:   1. Do you have an Advanced Directive? NO    2. Would you like information on Advanced Directives?  NO

## 2019-06-19 LAB
DEPRECATED HGB OTHER BLD-IMP: 0 %
HGB A MFR BLD: 95 % (ref 96.4–98.8)
HGB A2 MFR BLD COLUMN CHROM: 5 % (ref 1.8–3.2)
HGB C MFR BLD: 0 %
HGB F MFR BLD: 0 % (ref 0–2)
HGB FRACT BLD-IMP: ABNORMAL
HGB S BLD QL SOLY: NEGATIVE
HGB S MFR BLD: 0 %

## 2019-06-23 NOTE — PROGRESS NOTES
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

## 2019-10-25 ENCOUNTER — OFFICE VISIT (OUTPATIENT)
Dept: FAMILY MEDICINE CLINIC | Age: 84
End: 2019-10-25

## 2019-10-25 VITALS
HEART RATE: 72 BPM | RESPIRATION RATE: 18 BRPM | TEMPERATURE: 96.5 F | WEIGHT: 86.4 LBS | SYSTOLIC BLOOD PRESSURE: 129 MMHG | OXYGEN SATURATION: 97 % | BODY MASS INDEX: 18.14 KG/M2 | HEIGHT: 58 IN | DIASTOLIC BLOOD PRESSURE: 58 MMHG

## 2019-10-25 DIAGNOSIS — Z23 ENCOUNTER FOR IMMUNIZATION: ICD-10-CM

## 2019-10-25 DIAGNOSIS — E87.6 HYPOKALEMIA: ICD-10-CM

## 2019-10-25 DIAGNOSIS — E03.9 ACQUIRED HYPOTHYROIDISM: ICD-10-CM

## 2019-10-25 DIAGNOSIS — J45.909 PERSISTENT ASTHMA WITHOUT COMPLICATION, UNSPECIFIED ASTHMA SEVERITY: ICD-10-CM

## 2019-10-25 DIAGNOSIS — I10 ESSENTIAL HYPERTENSION: Primary | ICD-10-CM

## 2019-10-25 PROBLEM — D56.3 BETA THALASSEMIA MINOR: Status: ACTIVE | Noted: 2019-10-25

## 2019-10-25 RX ORDER — POTASSIUM CHLORIDE 20 MEQ/1
TABLET, EXTENDED RELEASE ORAL
Qty: 90 TAB | Refills: 1 | Status: SHIPPED | OUTPATIENT
Start: 2019-10-25

## 2019-10-25 NOTE — PROGRESS NOTES
HISTORY OF PRESENT ILLNESS  Mk Mead is a 80 y.o. female. HPI  HTN, stable, bp is well controlled on med daily  Hypothyroid, stable on synthroid daily, last tsh was normal  Asthma, stable on advair daily, no wheezing or sob  Hypokalemia, stable on k dur  Review of Systems   Respiratory: Negative for shortness of breath and wheezing. Cardiovascular: Negative for chest pain, palpitations and leg swelling. Gastrointestinal: Negative for abdominal pain and nausea. Musculoskeletal: Negative for myalgias. Neurological: Negative for headaches. Physical Exam   Constitutional: She appears well-developed and well-nourished. Neck: No thyromegaly present. Cardiovascular: Normal rate and normal heart sounds. An irregularly irregular rhythm present. No murmur heard. Pulmonary/Chest: Effort normal and breath sounds normal. She has no wheezes. She has no rales. Abdominal: Soft. Bowel sounds are normal. There is no tenderness. Musculoskeletal: She exhibits no edema. Neurological: She is alert. Vitals reviewed. ASSESSMENT and PLAN  Diagnoses and all orders for this visit:    1. Essential hypertension, controlled  -     METABOLIC PANEL, BASIC; Future  -     CBC WITH AUTOMATED DIFF; Future    2. Hypokalemia, stable  -     potassium chloride (K-DUR, KLOR-CON) 20 mEq tablet; take 1 tablet by mouth daily  -     METABOLIC PANEL, BASIC; Future    3. Acquired hypothyroidism, stable  -     TSH 3RD GENERATION; Future  -     T4, FREE; Future    4. Persistent asthma without complication, unspecified asthma severity. controlled    Continue current meds    5.  Encounter for immunization  -     PNEUMOCOCCAL CONJ VACCINE 13 VALENT IM  -     ADMIN PNEUMOCOCCAL VACCINE    Lab Results   Component Value Date/Time    TSH 0.73 06/17/2019 10:44 AM     rtc 4 mos

## 2019-10-25 NOTE — PROGRESS NOTES
Mk Jean Baptiste is a 80 y.o. female (: 1930) presenting to address:    Chief Complaint   Patient presents with    Medication Evaluation       Vitals:    10/25/19 1454   BP: 129/58   Pulse: 72   Resp: 18   Temp: 96.5 °F (35.8 °C)   TempSrc: Oral   SpO2: 97%   Weight: 86 lb 6.4 oz (39.2 kg)   Height: 4' 10\" (1.473 m)   PainSc:   0 - No pain       Hearing/Vision:   Vision Screening Comments: Just had eye exam.    Learning Assessment:     Learning Assessment 2015   PRIMARY LEARNER Patient   HIGHEST LEVEL OF EDUCATION - PRIMARY LEARNER  DID NOT GRADUATE HIGH SCHOOL   BARRIERS PRIMARY LEARNER READING   CO-LEARNER CAREGIVER Yes   CO-LEARNER NAME Daughter- Mark Link HIGHEST LEVEL OF EDUCATION 4 YEARS  [address]   PRIMARY LANGUAGE CHINESE (MANDARIN)   PRIMARY LANGUAGE CO-LEARNER CHINESE (MANDARIN)    NEED No   LEARNER PREFERENCE PRIMARY PICTURES   LEARNER PREFERENCE CO-LEARNER READING   LEARNING SPECIAL TOPICS none   ANSWERED BY patient   RELATIONSHIP SELF   ASSESSMENT COMMENT n/a     Depression Screening:     3 most recent PHQ Screens 10/25/2019   PHQ Not Done -   Little interest or pleasure in doing things Not at all   Feeling down, depressed, irritable, or hopeless Not at all   Total Score PHQ 2 0     Fall Risk Assessment:     Fall Risk Assessment, last 12 mths 10/25/2019   Able to walk? Yes   Fall in past 12 months? Yes   Fall with injury? Yes   Number of falls in past 12 months 1   Fall Risk Score 2     Abuse Screening:     Abuse Screening Questionnaire 10/25/2019   Do you ever feel afraid of your partner? N   Are you in a relationship with someone who physically or mentally threatens you? N   Is it safe for you to go home? Y     Coordination of Care Questionaire:   1. Have you been to the ER, urgent care clinic since your last visit? Hospitalized since your last visit? NO    2.  Have you seen or consulted any other health care providers outside of the [facility] [address] since your last visit? Include any pap smears or colon screening. YES- eye doctor    Advanced Directive:   1. Do you have an Advanced Directive? YES    2. Would you like information on Advanced Directives? NO    Prevnar 13 Immunization/s administered today by Nicola Thomas CMA with patient/guardian's consent. Verified with Dr. Ridley Meals - given left deltoid 0.5 ml lot # [id number]  Exp. 07/21 NDC: [id number]  Patient tolerated procedure well. No bleeding observed at injection site. No reactions noted.

## 2019-10-27 DIAGNOSIS — J45.909 PERSISTENT ASTHMA WITHOUT COMPLICATION, UNSPECIFIED ASTHMA SEVERITY: ICD-10-CM

## 2019-10-29 RX ORDER — ALBUTEROL SULFATE 90 UG/1
AEROSOL, METERED RESPIRATORY (INHALATION)
Qty: 8.5 G | Refills: 5 | Status: SHIPPED | OUTPATIENT
Start: 2019-10-29

## 2019-12-17 ENCOUNTER — OFFICE VISIT (OUTPATIENT)
Dept: FAMILY MEDICINE CLINIC | Age: 84
End: 2019-12-17

## 2019-12-17 ENCOUNTER — HOSPITAL ENCOUNTER (OUTPATIENT)
Dept: LAB | Age: 84
Discharge: HOME | End: 2019-12-17
Payer: MEDICARE

## 2019-12-17 VITALS
HEIGHT: 58 IN | BODY MASS INDEX: 18.05 KG/M2 | TEMPERATURE: 97.2 F | OXYGEN SATURATION: 99 % | HEART RATE: 71 BPM | RESPIRATION RATE: 16 BRPM | DIASTOLIC BLOOD PRESSURE: 60 MMHG | SYSTOLIC BLOOD PRESSURE: 130 MMHG | WEIGHT: 86 LBS

## 2019-12-17 DIAGNOSIS — I10 ESSENTIAL HYPERTENSION: ICD-10-CM

## 2019-12-17 DIAGNOSIS — K64.9 HEMORRHOIDS, UNSPECIFIED HEMORRHOID TYPE: ICD-10-CM

## 2019-12-17 DIAGNOSIS — E03.9 ACQUIRED HYPOTHYROIDISM: ICD-10-CM

## 2019-12-17 DIAGNOSIS — I48.91 ATRIAL FIBRILLATION, UNSPECIFIED TYPE (HCC): ICD-10-CM

## 2019-12-17 DIAGNOSIS — R60.0 BILATERAL LOWER EXTREMITY EDEMA: Primary | ICD-10-CM

## 2019-12-17 DIAGNOSIS — E87.6 HYPOKALEMIA: ICD-10-CM

## 2019-12-17 LAB
ANION GAP SERPL CALC-SCNC: 3 MMOL/L (ref 3–18)
BASOPHILS # BLD: 0 K/UL (ref 0–0.1)
BASOPHILS NFR BLD: 0 % (ref 0–2)
BUN SERPL-MCNC: 23 MG/DL (ref 7–18)
BUN/CREAT SERPL: 20 (ref 12–20)
CALCIUM SERPL-MCNC: 8.6 MG/DL (ref 8.5–10.1)
CHLORIDE SERPL-SCNC: 106 MMOL/L (ref 100–111)
CO2 SERPL-SCNC: 31 MMOL/L (ref 21–32)
CREAT SERPL-MCNC: 1.17 MG/DL (ref 0.6–1.3)
DIFFERENTIAL METHOD BLD: ABNORMAL
EOSINOPHIL # BLD: 0.1 K/UL (ref 0–0.4)
EOSINOPHIL NFR BLD: 1 % (ref 0–5)
ERYTHROCYTE [DISTWIDTH] IN BLOOD BY AUTOMATED COUNT: 18.5 % (ref 11.6–14.5)
GLUCOSE SERPL-MCNC: 81 MG/DL (ref 74–99)
HCT VFR BLD AUTO: 31.1 % (ref 35–45)
HGB BLD-MCNC: 10 G/DL (ref 12–16)
LYMPHOCYTES # BLD: 0.8 K/UL (ref 0.9–3.6)
LYMPHOCYTES NFR BLD: 19 % (ref 21–52)
MCH RBC QN AUTO: 20.7 PG (ref 24–34)
MCHC RBC AUTO-ENTMCNC: 32.2 G/DL (ref 31–37)
MCV RBC AUTO: 64.3 FL (ref 74–97)
MONOCYTES # BLD: 0.2 K/UL (ref 0.05–1.2)
MONOCYTES NFR BLD: 5 % (ref 3–10)
NEUTS SEG # BLD: 3.2 K/UL (ref 1.8–8)
NEUTS SEG NFR BLD: 75 % (ref 40–73)
PLATELET # BLD AUTO: 155 K/UL (ref 135–420)
POTASSIUM SERPL-SCNC: 3.8 MMOL/L (ref 3.5–5.5)
RBC # BLD AUTO: 4.84 M/UL (ref 4.2–5.3)
SODIUM SERPL-SCNC: 140 MMOL/L (ref 136–145)
TSH SERPL DL<=0.05 MIU/L-ACNC: 7.4 UIU/ML (ref 0.36–3.74)
WBC # BLD AUTO: 4.2 K/UL (ref 4.6–13.2)

## 2019-12-17 PROCEDURE — 85025 COMPLETE CBC W/AUTO DIFF WBC: CPT

## 2019-12-17 PROCEDURE — 80048 BASIC METABOLIC PNL TOTAL CA: CPT

## 2019-12-17 PROCEDURE — 36415 COLL VENOUS BLD VENIPUNCTURE: CPT

## 2019-12-17 PROCEDURE — 84443 ASSAY THYROID STIM HORMONE: CPT

## 2019-12-17 PROCEDURE — 84439 ASSAY OF FREE THYROXINE: CPT

## 2019-12-17 RX ORDER — HYDROCORTISONE 25 MG/G
CREAM TOPICAL 2 TIMES DAILY
Qty: 30 G | Refills: 1 | Status: SHIPPED | OUTPATIENT
Start: 2019-12-17

## 2019-12-17 NOTE — PROGRESS NOTES
HISTORY OF PRESENT ILLNESS  Mk Del Valle is a 80 y.o. female. HPI  Pt is in with her daughter  C/o feet swelling, bilateral, worse in the afternoon, no swelling in am  C/o hemorrhoids, slightly painful at times, pt noticed slight blood on the tissue papers at times, no blood in stools  HTN, stable, bp is well controlled  Hypothyroid, stable on synthroid  A fib, stable, followed by Cardiology  Review of Systems   Constitutional: Negative for chills and fever. Respiratory: Negative for cough, shortness of breath and wheezing. Cardiovascular: Negative for chest pain and palpitations. Gastrointestinal: Negative for abdominal pain, blood in stool, constipation and nausea. Neurological: Negative for dizziness and headaches. Physical Exam  Vitals signs reviewed. Constitutional:       Appearance: Normal appearance. She is well-developed. Neck:      Thyroid: No thyromegaly. Cardiovascular:      Rate and Rhythm: Normal rate. Rhythm irregularly irregular. Heart sounds: Normal heart sounds. No murmur. Pulmonary:      Effort: Pulmonary effort is normal.      Breath sounds: Normal breath sounds. No wheezing or rales. Abdominal:      General: Bowel sounds are normal.      Palpations: Abdomen is soft. Tenderness: There is no tenderness. Genitourinary:     Comments: Pt refused rectal exam  Musculoskeletal:      Right lower leg: Edema (trace edema in ankle/foot) present. Left lower leg: Edema (trace edema, ankle/foot) present. Neurological:      Mental Status: She is alert. ASSESSMENT and PLAN  Diagnoses and all orders for this visit:    1.  Bilateral lower extremity edema, most likely 2/2 norvasc, discussed with family, the swelling is mild, advised to keep legs elevated, use support stocking during the day, also discussed decreasing norvasc dose to 5 mg and increasing diovan dose to 320, but they declined for now since her swelling is mild, they will monitor and call if worsening    2. Hemorrhoids, unspecified hemorrhoid type  -     hydrocortisone (ANUSOL-HC) 2.5 % rectal cream; Insert  into rectum two (2) times a day. For hemorrhoids  - pt declined rectal exam now, she also declined referral to rectal surgery, family will monitor and call for referral if worsening or if pt changes her mind in the future    3. Essential hypertension, stable    4. Acquired hypothyroidism, stable  Continue current meds, refills placed yesterday    Labs today, labs ordered already    5. Atrial fibrillation, unspecified type [facility]), followed by Cardiology  rtc 4 mos    Lab Results   Component Value Date/Time    TSH 0.73 06/17/2019 10:44 AM     Lab Results   Component Value Date/Time    Sodium 141 06/17/2019 10:44 AM    Potassium 3.4 (L) 06/17/2019 10:44 AM    Chloride 104 06/17/2019 10:44 AM    CO2 30 06/17/2019 10:44 AM    Anion gap 7 06/17/2019 10:44 AM    Glucose 79 06/17/2019 10:44 AM    BUN 20 (H) 06/17/2019 10:44 AM    Creatinine 1.03 06/17/2019 10:44 AM    BUN/Creatinine ratio 19 06/17/2019 10:44 AM    GFR est AA >60 06/17/2019 10:44 AM    GFR est non-AA 51 (L) 06/17/2019 10:44 AM    Calcium 8.6 06/17/2019 10:44 AM    Bilirubin, total 1.2 (H) 06/17/2019 10:44 AM    AST (SGOT) 21 06/17/2019 10:44 AM    Alk.  phosphatase 63 06/17/2019 10:44 AM    Protein, total 6.5 06/17/2019 10:44 AM    Albumin 3.5 06/17/2019 10:44 AM    Globulin 3.0 06/17/2019 10:44 AM    A-G Ratio 1.2 06/17/2019 10:44 AM    ALT (SGPT) 19 06/17/2019 10:44 AM

## 2019-12-17 NOTE — PATIENT INSTRUCTIONS
Leg and Ankle Edema: Care Instructions Your Care Instructions Swelling in the legs, ankles, and feet is called edema. It is common after you sit or stand for a while. Long plane flights or car rides often cause swelling in the legs and feet. You may also have swelling if you have to stand for long periods of time at your job. Problems with the veins in the legs (varicose veins) and changes in hormones can also cause swelling. Sometimes the swelling in the ankles and feet is caused by a more serious problem, such as heart failure, infection, blood clots, or liver or kidney disease. Follow-up care is a key part of your treatment and safety. Be sure to make and go to all appointments, and call your doctor if you are having problems. It's also a good idea to know your test results and keep a list of the medicines you take. How can you care for yourself at home? · If your doctor gave you medicine, take it as prescribed. Call your doctor if you think you are having a problem with your medicine. · Whenever you are resting, raise your legs up. Try to keep the swollen area higher than the level of your heart. · Take breaks from standing or sitting in one position. ? Walk around to increase the blood flow in your lower legs. ? Move your feet and ankles often while you stand, or tighten and relax your leg muscles. · Wear support stockings. Put them on in the morning, before swelling gets worse. · Eat a balanced diet. Lose weight if you need to. · Limit the amount of salt (sodium) in your diet. Salt holds fluid in the body and may increase swelling. When should you call for help? Call 911 anytime you think you may need emergency care. For example, call if: 
  · You have symptoms of a blood clot in your lung (called a pulmonary embolism). These may include: 
? Sudden chest pain. ? Trouble breathing. ? Coughing up blood.  
 Call your doctor now or seek immediate medical care if:   · You have signs of a blood clot, such as: 
? Pain in your calf, back of the knee, thigh, or groin. ? Redness and swelling in your leg or groin.  
  · You have symptoms of infection, such as: 
? Increased pain, swelling, warmth, or redness. ? Red streaks or pus. ? A fever.  
 Watch closely for changes in your health, and be sure to contact your doctor if: 
  · Your swelling is getting worse.  
  · You have new or worsening pain in your legs.  
  · You do not get better as expected. Where can you learn more? Go to http://shonna-richard.info/. Enter K917 in the search box to learn more about \"Leg and Ankle Edema: Care Instructions. \" Current as of: June 26, 2019 Content Version: 12.2 © 9237-0176 [employer]. Care instructions adapted under license by [email or web address] (which disclaims liability or warranty for this information). If you have questions about a medical condition or this instruction, always ask your healthcare professional. [place] [address] any warranty or liability for your use of this information.

## 2019-12-18 DIAGNOSIS — E03.9 ACQUIRED HYPOTHYROIDISM: Primary | ICD-10-CM

## 2019-12-18 LAB — T4 FREE SERPL-MCNC: 1.3 NG/DL (ref 0.7–1.5)

## 2019-12-18 RX ORDER — LEVOTHYROXINE SODIUM 88 UG/1
88 TABLET ORAL
Qty: 90 TAB | Refills: 0 | Status: SHIPPED | OUTPATIENT
Start: 2019-12-18

## 2019-12-18 NOTE — PROGRESS NOTES
Hgb is stable @ 10  TSH is low, thyroid is under corrected, need to increase synthroid dose, stop synthroid 75, start synthroid 88 mcg daily, repeat TSH in 6 weeks, lab order placed, and new Rx sent

## 2019-12-30 ENCOUNTER — TELEPHONE (OUTPATIENT)
Dept: FAMILY MEDICINE CLINIC | Age: 84
End: 2019-12-30

## 2019-12-30 NOTE — TELEPHONE ENCOUNTER
Patient's daughter called requesting that  order the patient a smaller walker for her because the one that  ordered is too big for her.

## 2020-01-09 ENCOUNTER — TELEPHONE (OUTPATIENT)
Dept: FAMILY MEDICINE CLINIC | Age: 85
End: 2020-01-09

## 2020-01-09 NOTE — TELEPHONE ENCOUNTER
Patient's daughter is calling to state that the patient's arms hands & legs are swelling. Offered the 1st available appointment of 1/20/20. Daughter declined, stating that Dr Ephraim Leventhal knows her situation and can squeeze her in to be seen. Would like a call back form the nurse.

## 2020-01-10 NOTE — TELEPHONE ENCOUNTER
I spoke to patient's daughter and gave Dr Dell Hurst' medication change instructions.  She verbalized understanding and I scheduled her for:  Future Appointments   Date Time Provider Mireille Paige   1/14/2020  2:00 PM Dhiraj Ortiz MD [facility] [place], operated by [facility]

## 2020-01-10 NOTE — TELEPHONE ENCOUNTER
Please look at my last note, I told the family that this is most likely due to norvasc, and we can adjust the dose but they decided to monitor since it was mild  Please ask the daughter to give pt HALF tablet of the norvasc daily, and Double the Diovan to 2 tablets daily until her appt

## 2020-01-14 ENCOUNTER — OFFICE VISIT (OUTPATIENT)
Dept: FAMILY MEDICINE CLINIC | Age: 85
End: 2020-01-14

## 2020-01-14 VITALS
DIASTOLIC BLOOD PRESSURE: 65 MMHG | OXYGEN SATURATION: 93 % | RESPIRATION RATE: 20 BRPM | SYSTOLIC BLOOD PRESSURE: 130 MMHG | HEART RATE: 80 BPM | WEIGHT: 87.4 LBS | BODY MASS INDEX: 18.34 KG/M2 | HEIGHT: 58 IN | TEMPERATURE: 95.4 F

## 2020-01-14 DIAGNOSIS — R60.0 EDEMA OF BOTH LEGS: ICD-10-CM

## 2020-01-14 DIAGNOSIS — R60.0 ARM EDEMA: ICD-10-CM

## 2020-01-14 DIAGNOSIS — I10 ESSENTIAL HYPERTENSION: Primary | ICD-10-CM

## 2020-01-14 RX ORDER — VALSARTAN AND HYDROCHLOROTHIAZIDE 160; 12.5 MG/1; MG/1
2 TABLET, FILM COATED ORAL DAILY
Qty: 180 TAB | Refills: 1 | Status: SHIPPED | OUTPATIENT
Start: 2020-01-14

## 2020-01-14 RX ORDER — AMLODIPINE BESYLATE 5 MG/1
5 TABLET ORAL DAILY
Qty: 90 TAB | Refills: 1 | Status: SHIPPED | OUTPATIENT
Start: 2020-01-14

## 2020-01-14 NOTE — PROGRESS NOTES
Juan Umanzor is a 80 y.o. female (: 1930) presenting to address:    Chief Complaint   Patient presents with    Arm swelling     Left- and follow up bilateral LE edema       Vitals:    20 1400   BP: 130/65   Pulse: 80   Resp: 20   Temp: 95.4 °F (35.2 °C)   TempSrc: Oral   SpO2: 93%   Weight: 87 lb 6.4 oz (39.6 kg)   Height: 4' 10\" (1.473 m)   PainSc:   0 - No pain       Learning Assessment:     Learning Assessment 2015   PRIMARY LEARNER Patient   HIGHEST LEVEL OF EDUCATION - PRIMARY LEARNER  DID NOT GRADUATE HIGH SCHOOL   BARRIERS PRIMARY LEARNER READING   CO-LEARNER CAREGIVER Yes   CO-LEARNER NAME Daughter- [address] LEVEL OF EDUCATION 4 YEARS OF COLLEGE   BARRIERS CO-LEARNER NONE   PRIMARY LANGUAGE CHINESE (MANDARIN)   PRIMARY LANGUAGE CO-LEARNER CHINESE (MANDARIN)    NEED No   LEARNER PREFERENCE PRIMARY PICTURES   LEARNER PREFERENCE CO-LEARNER READING   LEARNING SPECIAL TOPICS none   ANSWERED BY patient   RELATIONSHIP SELF   ASSESSMENT COMMENT n/a     Depression Screening:     3 most recent PHQ Screens 2019   PHQ Not Done -   Little interest or pleasure in doing things Not at all   Feeling down, depressed, irritable, or hopeless Not at all   Total Score PHQ 2 0     Fall Risk Assessment:     Fall Risk Assessment, last 12 mths 2019   Able to walk? Yes   Fall in past 12 months? No   Fall with injury? -   Number of falls in past 12 months -   Fall Risk Score -     Abuse Screening:     Abuse Screening Questionnaire 10/25/2019   Do you ever feel afraid of your partner? N   Are you in a relationship with someone who physically or mentally threatens you? N   Is it safe for you to go home? Y     Coordination of Care Questionaire:   1. Have you been to the ER, urgent care clinic since your last visit? Hospitalized since your last visit? NO    2.  Have you seen or consulted any other health care providers outside of the [address] [place] [address] since your last visit? Include any pap smears or colon screening. NO    Advanced Directive:   1. Do you have an Advanced Directive? YES    2. Would you like information on Advanced Directives?  NO

## 2020-01-15 NOTE — PROGRESS NOTES
HISTORY OF PRESENT ILLNESS  Mk March is a 80 y.o. female. HPI  HTN, stable, bp is controlled on meds daily, including norvasc and diovan/hctz  C/o worsening lower extremities edema, worse in the afternoon  C/o left arm edema, started few days ago and getting worse, no fever or chills, no rash, no c/p or sob  Review of Systems   Constitutional: Negative for chills and fever. Respiratory: Negative for shortness of breath. Cardiovascular: Negative for chest pain, palpitations and orthopnea. Gastrointestinal: Negative for nausea. Skin: Negative for rash. Neurological: Negative for dizziness. Physical Exam  Vitals signs reviewed. Neck:      Vascular: No JVD. Cardiovascular:      Rate and Rhythm: Normal rate. Rhythm irregularly irregular. Heart sounds: No murmur. No friction rub. No gallop. Pulmonary:      Effort: Pulmonary effort is normal.      Breath sounds: No wheezing or rales. Abdominal:      Palpations: Abdomen is soft. Tenderness: There is no tenderness. Musculoskeletal:         General: Swelling (left arm edema from the elbow down to the hand, mild erythema, no tenderness) present. Right lower leg: Edema (1-2 plus pitting edema) present. Left lower leg: Edema (1-2 plus pitting edema) present. Skin:     Findings: No rash. ASSESSMENT and PLAN  Diagnoses and all orders for this visit:    1. Essential hypertension, controlled  -     amLODIPine (NORVASC) 5 mg tablet; Take 1 Tab by mouth daily. take 1 tablet by mouth once daily  -     valsartan-hydroCHLOROthiazide (DIOVAN-HCT) 160-12.5 mg per tablet; Take 2 Tabs by mouth daily. 2. Edema of both legs, will decrease norvasc dose and increase her diovan/hctz dose  -     valsartan-hydroCHLOROthiazide (DIOVAN-HCT) 160-12.5 mg per tablet; Take 2 Tabs by mouth daily. 3. Arm edema, ?  Etiology, r/o DVT  Pt's daughters will take her to the ER for further w/u